# Patient Record
Sex: MALE | Race: WHITE | NOT HISPANIC OR LATINO | Employment: UNEMPLOYED | URBAN - METROPOLITAN AREA
[De-identification: names, ages, dates, MRNs, and addresses within clinical notes are randomized per-mention and may not be internally consistent; named-entity substitution may affect disease eponyms.]

---

## 2018-08-15 ENCOUNTER — HOSPITAL ENCOUNTER (EMERGENCY)
Facility: HOSPITAL | Age: 58
Discharge: HOME | End: 2018-08-15
Attending: EMERGENCY MEDICINE
Payer: MEDICAID

## 2018-08-15 VITALS
BODY MASS INDEX: 24.74 KG/M2 | RESPIRATION RATE: 20 BRPM | HEART RATE: 89 BPM | TEMPERATURE: 98 F | SYSTOLIC BLOOD PRESSURE: 159 MMHG | HEIGHT: 60 IN | OXYGEN SATURATION: 97 % | DIASTOLIC BLOOD PRESSURE: 92 MMHG | WEIGHT: 126 LBS

## 2018-08-15 DIAGNOSIS — T83.511A URINARY TRACT INFECTION ASSOCIATED WITH CATHETERIZATION OF URINARY TRACT, UNSPECIFIED INDWELLING URINARY CATHETER TYPE, INITIAL ENCOUNTER: ICD-10-CM

## 2018-08-15 DIAGNOSIS — N39.0 URINARY TRACT INFECTION ASSOCIATED WITH CATHETERIZATION OF URINARY TRACT, UNSPECIFIED INDWELLING URINARY CATHETER TYPE, INITIAL ENCOUNTER: ICD-10-CM

## 2018-08-15 DIAGNOSIS — R33.9 URINARY RETENTION: Primary | ICD-10-CM

## 2018-08-15 LAB
ALBUMIN SERPL BCP-MCNC: 3.8 G/DL
ALP SERPL-CCNC: 138 U/L
ALT SERPL W/O P-5'-P-CCNC: 12 U/L
ANION GAP SERPL CALC-SCNC: 9 MMOL/L
AST SERPL-CCNC: 17 U/L
BACTERIA #/AREA URNS HPF: ABNORMAL /HPF
BILIRUB SERPL-MCNC: 0.4 MG/DL
BILIRUB UR QL STRIP: NEGATIVE
BUN SERPL-MCNC: 15 MG/DL
CALCIUM SERPL-MCNC: 9.8 MG/DL
CHLORIDE SERPL-SCNC: 105 MMOL/L
CLARITY UR: ABNORMAL
CO2 SERPL-SCNC: 24 MMOL/L
COLOR UR: YELLOW
CREAT SERPL-MCNC: 0.9 MG/DL
ERYTHROCYTE [DISTWIDTH] IN BLOOD BY AUTOMATED COUNT: 13.3 %
EST. GFR  (AFRICAN AMERICAN): >60 ML/MIN/1.73 M^2
EST. GFR  (NON AFRICAN AMERICAN): >60 ML/MIN/1.73 M^2
GLUCOSE SERPL-MCNC: 98 MG/DL
GLUCOSE UR QL STRIP: NEGATIVE
HCT VFR BLD AUTO: 45.6 %
HGB BLD-MCNC: 15 G/DL
HGB UR QL STRIP: ABNORMAL
KETONES UR QL STRIP: NEGATIVE
LEUKOCYTE ESTERASE UR QL STRIP: ABNORMAL
MCH RBC QN AUTO: 29.8 PG
MCHC RBC AUTO-ENTMCNC: 32.9 G/DL
MCV RBC AUTO: 91 FL
MICROSCOPIC COMMENT: ABNORMAL
NITRITE UR QL STRIP: POSITIVE
PH UR STRIP: 8 [PH] (ref 5–8)
PLATELET # BLD AUTO: 269 K/UL
PMV BLD AUTO: 8.5 FL
POTASSIUM SERPL-SCNC: 5.1 MMOL/L
PROT SERPL-MCNC: 7.5 G/DL
PROT UR QL STRIP: NEGATIVE
RBC # BLD AUTO: 5.04 M/UL
RBC #/AREA URNS HPF: 10 /HPF (ref 0–4)
SODIUM SERPL-SCNC: 138 MMOL/L
SP GR UR STRIP: 1.01 (ref 1–1.03)
SQUAMOUS #/AREA URNS HPF: 0 /HPF
URN SPEC COLLECT METH UR: ABNORMAL
UROBILINOGEN UR STRIP-ACNC: NEGATIVE EU/DL
WBC # BLD AUTO: 11.5 K/UL
WBC #/AREA URNS HPF: 100 /HPF (ref 0–5)
WBC CLUMPS URNS QL MICRO: ABNORMAL

## 2018-08-15 PROCEDURE — 99284 EMERGENCY DEPT VISIT MOD MDM: CPT | Mod: 25

## 2018-08-15 PROCEDURE — 81000 URINALYSIS NONAUTO W/SCOPE: CPT

## 2018-08-15 PROCEDURE — 85027 COMPLETE CBC AUTOMATED: CPT

## 2018-08-15 PROCEDURE — 51702 INSERT TEMP BLADDER CATH: CPT

## 2018-08-15 PROCEDURE — 63600175 PHARM REV CODE 636 W HCPCS: Performed by: EMERGENCY MEDICINE

## 2018-08-15 PROCEDURE — 96365 THER/PROPH/DIAG IV INF INIT: CPT | Mod: 59

## 2018-08-15 PROCEDURE — 80053 COMPREHEN METABOLIC PANEL: CPT

## 2018-08-15 PROCEDURE — 25000003 PHARM REV CODE 250: Performed by: EMERGENCY MEDICINE

## 2018-08-15 PROCEDURE — 99285 EMERGENCY DEPT VISIT HI MDM: CPT | Mod: 25

## 2018-08-15 RX ORDER — OXYCODONE AND ACETAMINOPHEN 5; 325 MG/1; MG/1
1 TABLET ORAL
Status: COMPLETED | OUTPATIENT
Start: 2018-08-15 | End: 2018-08-15

## 2018-08-15 RX ORDER — CEPHALEXIN 500 MG/1
500 CAPSULE ORAL 4 TIMES DAILY
Qty: 20 CAPSULE | Refills: 0 | Status: SHIPPED | OUTPATIENT
Start: 2018-08-15 | End: 2018-08-20

## 2018-08-15 RX ADMIN — CEFTRIAXONE 1 G: 1 INJECTION, SOLUTION INTRAVENOUS at 05:08

## 2018-08-15 RX ADMIN — OXYCODONE HYDROCHLORIDE AND ACETAMINOPHEN 1 TABLET: 5; 325 TABLET ORAL at 05:08

## 2018-08-15 NOTE — ED NOTES
Pt presents to the ED w/ c/o of urinary retention. Pt reports that the last time he urinated was last night. Pt reports that he had penis pain from the pressure of the urine. Pt denies n/v or chest pain. Pt reports that the pain in his penis has eased up since the placement of the velazquez on arrival. Pt reports that he normally does self caths at home. Pt is from [facility].

## 2018-08-15 NOTE — ED NOTES
Urine noted in velazquez bag. Pt reports pressure has eased up at this time but still reports it is there.

## 2018-08-15 NOTE — ED PROVIDER NOTES
Encounter Date: 8/15/2018    SCRIBE #1 NOTE: I, Trey Eisebnerg, am scribing for, and in the presence of,  Dr. Lopez. I have scribed the entire note.       History     Chief Complaint   Patient presents with    Urinary Retention     last urinated fully yesterday- pt from [facility]- per EMS staff attempted to cath pt but unable to drain bladder. pt self caths     Shaw Heredia is a 58 y.o. male who  has no past medical history on file.    The patient presents to the ED due to urinary retention that began today. Patient reports he previously required indwelling catheter, but due to recurrent clogging of catheters, he has been self-catheterizing for the last 2 weeks. He reports today he was unable to empty his bladder with a cath per usual. He last fully urinated yesterday. His Urologist is Dr. Mendoza. Patient denies any dysuria, fever, abdominal pain, or systemic symptoms. He reports no hematuria, but he did notice some blood after a catheter was removed earlier today.        The history is provided by the patient.     Review of patient's allergies indicates:  No Known Allergies  No past medical history on file.  No past surgical history on file.  No family history on file.  Social History     Tobacco Use    Smoking status: Not on file   Substance Use Topics    Alcohol use: Not on file    Drug use: Not on file     Review of Systems   Constitutional: Negative for chills and fever.   HENT: Negative for congestion, ear pain, rhinorrhea and sore throat.    Respiratory: Negative for cough, shortness of breath and wheezing.    Cardiovascular: Negative for chest pain and palpitations.   Gastrointestinal: Negative for abdominal pain, diarrhea, nausea and vomiting.   Genitourinary: Negative for dysuria and hematuria.   Musculoskeletal: Negative for back pain, myalgias and neck pain.   Skin: Negative for rash.   Neurological: Negative for dizziness, weakness, light-headedness and headaches.   Psychiatric/Behavioral:  Negative for confusion.   All other systems reviewed and are negative.      Physical Exam     Initial Vitals [08/15/18 1408]   BP Pulse Resp Temp SpO2   138/76 105 18 99.5 °F (37.5 °C) 97 %      MAP       --         Physical Exam    Nursing note and vitals reviewed.  Constitutional: He appears well-developed and well-nourished. He is not diaphoretic. No distress.   HENT:   Head: Normocephalic and atraumatic.   Mouth/Throat: Oropharynx is clear and moist.   Eyes: Conjunctivae and EOM are normal. Pupils are equal, round, and reactive to light.   Neck: Normal range of motion. Neck supple.   Cardiovascular: Normal rate, regular rhythm, normal heart sounds and intact distal pulses.   Pulmonary/Chest: Breath sounds normal. No respiratory distress. He has no wheezes.   Abdominal: Soft. Bowel sounds are normal. He exhibits no distension and no mass. There is no tenderness.   Genitourinary:   Genitourinary Comments: Spliced inferior aspect of the penis and glans.  No active bleeding.   Musculoskeletal: Normal range of motion. He exhibits no edema or tenderness.   Neurological: He is alert and oriented to person, place, and time. He has normal strength. No cranial nerve deficit or sensory deficit.   Skin: Skin is warm and dry. No rash noted.   Psychiatric: Thought content normal.         ED Course   Procedures  Labs Reviewed   CBC WITHOUT DIFFERENTIAL - Abnormal; Notable for the following components:       Result Value    MPV 8.5 (*)     All other components within normal limits   COMPREHENSIVE METABOLIC PANEL - Abnormal; Notable for the following components:    Alkaline Phosphatase 138 (*)     All other components within normal limits   URINALYSIS - Abnormal; Notable for the following components:    Appearance, UA Cloudy (*)     Occult Blood UA 2+ (*)     Nitrite, UA Positive (*)     Leukocytes, UA 3+ (*)     All other components within normal limits   URINALYSIS MICROSCOPIC - Abnormal; Notable for the following components:     RBC, UA 10 (*)     WBC,  (*)     Bacteria, UA Many (*)     All other components within normal limits          Imaging Results    None          Medical Decision Making:   Initial Assessment:   59 y/o male history of chronic urinary retention requiring intermittent self cath presents with urinary retention and inability to pass a velazquez himself today. On arrival, vitals and exam unremarkable. Will obtain bladder scan, basic labs/UA, and attempt velazquez placement.  Differential Diagnosis:   Differential Diagnosis includes, but is not limited to:  STI, urethritis, testicular/appendix torsion, epididymitis, orchitis, UTI, kidney stone, urinary retention, appendicitis, prostatitis, testicular mass/neoplasm, incarcerated/strangulated hernia.    Clinical Tests:   Lab Tests: Ordered and Reviewed  ED Management:  Evlazquez placed in ED with successful drainage of urine.  UA revealed nitrite + UTI. RX Keflex.    Discussed patient with Dr. Mendoza, hospitals Urology, who agrees with plan for leaving indwelling velazquez in and following up in outpatient clinic.   Recommends flushing PRN to avoid sediment or obstruction.    Patient updated on findings and comfortable with plan at this time.  Patient given strict return precautions including worsening symptoms, worsening pain, inability to empty bladder, fever, or any other concerns.  Patient is stable for discharge.  Upon re-evaluation, the patient's status has imprved.  After complete ED evaluation, clinical impression is most consistent with urinary retention, UTI.  At this time, I feel there is no emergent condition requiring further evaluation or admission. I believe the patient is stable for discharge from the ED. The patient and any additional family present were updated with test results, overall clinical impression, and recommended further plan of care. All questions were answered. The patient expressed understanding and agreed with current plan for discharge with Urology  follow-up within 1 week. Strict return precautions were provided, including return/worsening of current symptoms or any other concerns.                               Clinical Impression:     1. Urinary retention    2. Urinary tract infection associated with catheterization of urinary tract, unspecified indwelling urinary catheter type, initial encounter                  I, Dr. Robert Lopez, personally performed the services described in this documentation. All medical record entries made by the scribe were at my direction and in my presence.  I have reviewed the chart and agree that the record reflects my personal performance and is accurate and complete.     Robert Lopez MD.                 Robert Lopez MD  09/13/18 0502

## 2020-05-27 DIAGNOSIS — Z20.822 SUSPECTED COVID-19 VIRUS INFECTION: Primary | ICD-10-CM

## 2020-05-29 ENCOUNTER — LAB VISIT (OUTPATIENT)
Dept: LAB | Facility: HOSPITAL | Age: 60
End: 2020-05-29
Payer: MEDICAID

## 2020-05-29 DIAGNOSIS — Z20.822 SUSPECTED COVID-19 VIRUS INFECTION: ICD-10-CM

## 2020-05-29 LAB — SARS-COV-2 IGG SERPLBLD QL IA.RAPID: NEGATIVE

## 2020-05-29 PROCEDURE — 86769 SARS-COV-2 COVID-19 ANTIBODY: CPT

## 2020-05-29 PROCEDURE — 36415 COLL VENOUS BLD VENIPUNCTURE: CPT

## 2020-06-08 ENCOUNTER — LAB VISIT (OUTPATIENT)
Dept: LAB | Facility: HOSPITAL | Age: 60
End: 2020-06-08
Payer: MEDICAID

## 2020-06-08 DIAGNOSIS — Z20.822 SUSPECTED COVID-19 VIRUS INFECTION: ICD-10-CM

## 2020-06-08 PROCEDURE — U0003 INFECTIOUS AGENT DETECTION BY NUCLEIC ACID (DNA OR RNA); SEVERE ACUTE RESPIRATORY SYNDROME CORONAVIRUS 2 (SARS-COV-2) (CORONAVIRUS DISEASE [COVID-19]), AMPLIFIED PROBE TECHNIQUE, MAKING USE OF HIGH THROUGHPUT TECHNOLOGIES AS DESCRIBED BY CMS-2020-01-R: HCPCS

## 2020-06-10 LAB — SARS-COV-2 RNA RESP QL NAA+PROBE: NOT DETECTED

## 2020-06-15 ENCOUNTER — LAB VISIT (OUTPATIENT)
Dept: LAB | Facility: OTHER | Age: 60
End: 2020-06-15
Attending: INTERNAL MEDICINE
Payer: MEDICAID

## 2020-06-15 DIAGNOSIS — Z20.822 SUSPECTED COVID-19 VIRUS INFECTION: ICD-10-CM

## 2020-06-15 PROCEDURE — U0003 INFECTIOUS AGENT DETECTION BY NUCLEIC ACID (DNA OR RNA); SEVERE ACUTE RESPIRATORY SYNDROME CORONAVIRUS 2 (SARS-COV-2) (CORONAVIRUS DISEASE [COVID-19]), AMPLIFIED PROBE TECHNIQUE, MAKING USE OF HIGH THROUGHPUT TECHNOLOGIES AS DESCRIBED BY CMS-2020-01-R: HCPCS

## 2020-06-17 LAB — SARS-COV-2 RNA RESP QL NAA+PROBE: NOT DETECTED

## 2020-06-22 ENCOUNTER — LAB VISIT (OUTPATIENT)
Dept: LAB | Facility: OTHER | Age: 60
End: 2020-06-22
Payer: MEDICAID

## 2020-06-22 DIAGNOSIS — Z20.822 SUSPECTED COVID-19 VIRUS INFECTION: ICD-10-CM

## 2020-06-22 PROCEDURE — U0003 INFECTIOUS AGENT DETECTION BY NUCLEIC ACID (DNA OR RNA); SEVERE ACUTE RESPIRATORY SYNDROME CORONAVIRUS 2 (SARS-COV-2) (CORONAVIRUS DISEASE [COVID-19]), AMPLIFIED PROBE TECHNIQUE, MAKING USE OF HIGH THROUGHPUT TECHNOLOGIES AS DESCRIBED BY CMS-2020-01-R: HCPCS

## 2020-06-25 LAB — SARS-COV-2 RNA RESP QL NAA+PROBE: NOT DETECTED

## 2020-06-29 ENCOUNTER — LAB VISIT (OUTPATIENT)
Dept: LAB | Facility: OTHER | Age: 60
End: 2020-06-29
Payer: MEDICAID

## 2020-06-29 DIAGNOSIS — Z20.822 SUSPECTED COVID-19 VIRUS INFECTION: ICD-10-CM

## 2020-06-29 PROCEDURE — U0003 INFECTIOUS AGENT DETECTION BY NUCLEIC ACID (DNA OR RNA); SEVERE ACUTE RESPIRATORY SYNDROME CORONAVIRUS 2 (SARS-COV-2) (CORONAVIRUS DISEASE [COVID-19]), AMPLIFIED PROBE TECHNIQUE, MAKING USE OF HIGH THROUGHPUT TECHNOLOGIES AS DESCRIBED BY CMS-2020-01-R: HCPCS

## 2020-07-02 LAB — SARS-COV-2 RNA RESP QL NAA+PROBE: NEGATIVE

## 2020-07-30 ENCOUNTER — LAB VISIT (OUTPATIENT)
Dept: LAB | Facility: OTHER | Age: 60
End: 2020-07-30
Payer: MEDICAID

## 2020-07-30 DIAGNOSIS — Z03.818 ENCOUNTER FOR OBSERVATION FOR SUSPECTED EXPOSURE TO OTHER BIOLOGICAL AGENTS RULED OUT: ICD-10-CM

## 2020-07-30 DIAGNOSIS — Z20.822 SUSPECTED COVID-19 VIRUS INFECTION: ICD-10-CM

## 2020-07-30 PROCEDURE — U0003 INFECTIOUS AGENT DETECTION BY NUCLEIC ACID (DNA OR RNA); SEVERE ACUTE RESPIRATORY SYNDROME CORONAVIRUS 2 (SARS-COV-2) (CORONAVIRUS DISEASE [COVID-19]), AMPLIFIED PROBE TECHNIQUE, MAKING USE OF HIGH THROUGHPUT TECHNOLOGIES AS DESCRIBED BY CMS-2020-01-R: HCPCS

## 2020-08-03 LAB — SARS-COV-2 RNA RESP QL NAA+PROBE: NORMAL

## 2020-08-06 ENCOUNTER — LAB VISIT (OUTPATIENT)
Dept: LAB | Facility: OTHER | Age: 60
End: 2020-08-06
Payer: MEDICAID

## 2020-08-06 DIAGNOSIS — Z03.818 ENCOUNTER FOR OBSERVATION FOR SUSPECTED EXPOSURE TO OTHER BIOLOGICAL AGENTS RULED OUT: ICD-10-CM

## 2020-08-06 PROCEDURE — U0003 INFECTIOUS AGENT DETECTION BY NUCLEIC ACID (DNA OR RNA); SEVERE ACUTE RESPIRATORY SYNDROME CORONAVIRUS 2 (SARS-COV-2) (CORONAVIRUS DISEASE [COVID-19]), AMPLIFIED PROBE TECHNIQUE, MAKING USE OF HIGH THROUGHPUT TECHNOLOGIES AS DESCRIBED BY CMS-2020-01-R: HCPCS

## 2020-08-07 LAB — SARS-COV-2 RNA RESP QL NAA+PROBE: NOT DETECTED

## 2020-08-13 ENCOUNTER — LAB VISIT (OUTPATIENT)
Dept: LAB | Facility: OTHER | Age: 60
End: 2020-08-13
Payer: MEDICAID

## 2020-08-13 DIAGNOSIS — Z03.818 ENCOUNTER FOR OBSERVATION FOR SUSPECTED EXPOSURE TO OTHER BIOLOGICAL AGENTS RULED OUT: ICD-10-CM

## 2020-08-13 PROCEDURE — U0003 INFECTIOUS AGENT DETECTION BY NUCLEIC ACID (DNA OR RNA); SEVERE ACUTE RESPIRATORY SYNDROME CORONAVIRUS 2 (SARS-COV-2) (CORONAVIRUS DISEASE [COVID-19]), AMPLIFIED PROBE TECHNIQUE, MAKING USE OF HIGH THROUGHPUT TECHNOLOGIES AS DESCRIBED BY CMS-2020-01-R: HCPCS

## 2020-08-16 LAB — SARS-COV-2 RNA RESP QL NAA+PROBE: NOT DETECTED

## 2020-08-20 ENCOUNTER — LAB VISIT (OUTPATIENT)
Dept: LAB | Facility: OTHER | Age: 60
End: 2020-08-20
Payer: MEDICAID

## 2020-08-20 DIAGNOSIS — Z03.818 ENCOUNTER FOR OBSERVATION FOR SUSPECTED EXPOSURE TO OTHER BIOLOGICAL AGENTS RULED OUT: ICD-10-CM

## 2020-08-20 PROCEDURE — U0003 INFECTIOUS AGENT DETECTION BY NUCLEIC ACID (DNA OR RNA); SEVERE ACUTE RESPIRATORY SYNDROME CORONAVIRUS 2 (SARS-COV-2) (CORONAVIRUS DISEASE [COVID-19]), AMPLIFIED PROBE TECHNIQUE, MAKING USE OF HIGH THROUGHPUT TECHNOLOGIES AS DESCRIBED BY CMS-2020-01-R: HCPCS

## 2020-08-21 LAB — SARS-COV-2 RNA RESP QL NAA+PROBE: NOT DETECTED

## 2020-08-27 ENCOUNTER — LAB VISIT (OUTPATIENT)
Dept: LAB | Facility: OTHER | Age: 60
End: 2020-08-27
Payer: MEDICAID

## 2020-08-27 DIAGNOSIS — Z03.818 ENCOUNTER FOR OBSERVATION FOR SUSPECTED EXPOSURE TO OTHER BIOLOGICAL AGENTS RULED OUT: ICD-10-CM

## 2020-08-27 PROCEDURE — U0003 INFECTIOUS AGENT DETECTION BY NUCLEIC ACID (DNA OR RNA); SEVERE ACUTE RESPIRATORY SYNDROME CORONAVIRUS 2 (SARS-COV-2) (CORONAVIRUS DISEASE [COVID-19]), AMPLIFIED PROBE TECHNIQUE, MAKING USE OF HIGH THROUGHPUT TECHNOLOGIES AS DESCRIBED BY CMS-2020-01-R: HCPCS

## 2020-08-28 LAB — SARS-COV-2 RNA RESP QL NAA+PROBE: NOT DETECTED

## 2020-09-03 ENCOUNTER — LAB VISIT (OUTPATIENT)
Dept: LAB | Facility: OTHER | Age: 60
End: 2020-09-03
Payer: MEDICAID

## 2020-09-03 DIAGNOSIS — Z03.818 ENCOUNTER FOR OBSERVATION FOR SUSPECTED EXPOSURE TO OTHER BIOLOGICAL AGENTS RULED OUT: ICD-10-CM

## 2020-09-03 PROCEDURE — U0003 INFECTIOUS AGENT DETECTION BY NUCLEIC ACID (DNA OR RNA); SEVERE ACUTE RESPIRATORY SYNDROME CORONAVIRUS 2 (SARS-COV-2) (CORONAVIRUS DISEASE [COVID-19]), AMPLIFIED PROBE TECHNIQUE, MAKING USE OF HIGH THROUGHPUT TECHNOLOGIES AS DESCRIBED BY CMS-2020-01-R: HCPCS

## 2020-09-05 LAB — SARS-COV-2 RNA RESP QL NAA+PROBE: NOT DETECTED

## 2020-10-01 ENCOUNTER — LAB VISIT (OUTPATIENT)
Dept: LAB | Facility: OTHER | Age: 60
End: 2020-10-01
Payer: MEDICAID

## 2020-10-01 DIAGNOSIS — Z03.818 ENCOUNTER FOR OBSERVATION FOR SUSPECTED EXPOSURE TO OTHER BIOLOGICAL AGENTS RULED OUT: ICD-10-CM

## 2020-10-01 PROCEDURE — U0003 INFECTIOUS AGENT DETECTION BY NUCLEIC ACID (DNA OR RNA); SEVERE ACUTE RESPIRATORY SYNDROME CORONAVIRUS 2 (SARS-COV-2) (CORONAVIRUS DISEASE [COVID-19]), AMPLIFIED PROBE TECHNIQUE, MAKING USE OF HIGH THROUGHPUT TECHNOLOGIES AS DESCRIBED BY CMS-2020-01-R: HCPCS

## 2020-10-03 LAB — SARS-COV-2 RNA RESP QL NAA+PROBE: NOT DETECTED

## 2020-10-08 ENCOUNTER — LAB VISIT (OUTPATIENT)
Dept: LAB | Facility: OTHER | Age: 60
End: 2020-10-08
Payer: MEDICAID

## 2020-10-08 DIAGNOSIS — Z03.818 ENCOUNTER FOR OBSERVATION FOR SUSPECTED EXPOSURE TO OTHER BIOLOGICAL AGENTS RULED OUT: ICD-10-CM

## 2020-10-08 PROCEDURE — U0003 INFECTIOUS AGENT DETECTION BY NUCLEIC ACID (DNA OR RNA); SEVERE ACUTE RESPIRATORY SYNDROME CORONAVIRUS 2 (SARS-COV-2) (CORONAVIRUS DISEASE [COVID-19]), AMPLIFIED PROBE TECHNIQUE, MAKING USE OF HIGH THROUGHPUT TECHNOLOGIES AS DESCRIBED BY CMS-2020-01-R: HCPCS

## 2020-10-09 LAB — SARS-COV-2 RNA RESP QL NAA+PROBE: NOT DETECTED

## 2020-10-15 ENCOUNTER — LAB VISIT (OUTPATIENT)
Dept: LAB | Facility: OTHER | Age: 60
End: 2020-10-15
Payer: MEDICAID

## 2020-10-15 DIAGNOSIS — Z03.818 ENCOUNTER FOR OBSERVATION FOR SUSPECTED EXPOSURE TO OTHER BIOLOGICAL AGENTS RULED OUT: ICD-10-CM

## 2020-10-15 PROCEDURE — U0003 INFECTIOUS AGENT DETECTION BY NUCLEIC ACID (DNA OR RNA); SEVERE ACUTE RESPIRATORY SYNDROME CORONAVIRUS 2 (SARS-COV-2) (CORONAVIRUS DISEASE [COVID-19]), AMPLIFIED PROBE TECHNIQUE, MAKING USE OF HIGH THROUGHPUT TECHNOLOGIES AS DESCRIBED BY CMS-2020-01-R: HCPCS

## 2020-10-16 LAB — SARS-COV-2 RNA RESP QL NAA+PROBE: NOT DETECTED

## 2020-10-22 ENCOUNTER — LAB VISIT (OUTPATIENT)
Dept: LAB | Facility: OTHER | Age: 60
End: 2020-10-22
Payer: MEDICAID

## 2020-10-22 DIAGNOSIS — Z03.818 ENCOUNTER FOR OBSERVATION FOR SUSPECTED EXPOSURE TO OTHER BIOLOGICAL AGENTS RULED OUT: ICD-10-CM

## 2020-10-22 PROCEDURE — U0003 INFECTIOUS AGENT DETECTION BY NUCLEIC ACID (DNA OR RNA); SEVERE ACUTE RESPIRATORY SYNDROME CORONAVIRUS 2 (SARS-COV-2) (CORONAVIRUS DISEASE [COVID-19]), AMPLIFIED PROBE TECHNIQUE, MAKING USE OF HIGH THROUGHPUT TECHNOLOGIES AS DESCRIBED BY CMS-2020-01-R: HCPCS

## 2020-10-23 LAB — SARS-COV-2 RNA RESP QL NAA+PROBE: NOT DETECTED

## 2020-10-29 ENCOUNTER — LAB VISIT (OUTPATIENT)
Dept: LAB | Facility: OTHER | Age: 60
End: 2020-10-29
Payer: MEDICAID

## 2020-10-29 DIAGNOSIS — Z03.818 ENCOUNTER FOR OBSERVATION FOR SUSPECTED EXPOSURE TO OTHER BIOLOGICAL AGENTS RULED OUT: ICD-10-CM

## 2020-10-29 PROCEDURE — U0003 INFECTIOUS AGENT DETECTION BY NUCLEIC ACID (DNA OR RNA); SEVERE ACUTE RESPIRATORY SYNDROME CORONAVIRUS 2 (SARS-COV-2) (CORONAVIRUS DISEASE [COVID-19]), AMPLIFIED PROBE TECHNIQUE, MAKING USE OF HIGH THROUGHPUT TECHNOLOGIES AS DESCRIBED BY CMS-2020-01-R: HCPCS

## 2020-10-30 LAB — SARS-COV-2 RNA RESP QL NAA+PROBE: NOT DETECTED

## 2020-11-05 ENCOUNTER — LAB VISIT (OUTPATIENT)
Dept: LAB | Facility: OTHER | Age: 60
End: 2020-11-05
Payer: MEDICAID

## 2020-11-05 DIAGNOSIS — Z03.818 ENCOUNTER FOR OBSERVATION FOR SUSPECTED EXPOSURE TO OTHER BIOLOGICAL AGENTS RULED OUT: ICD-10-CM

## 2020-11-05 PROCEDURE — U0003 INFECTIOUS AGENT DETECTION BY NUCLEIC ACID (DNA OR RNA); SEVERE ACUTE RESPIRATORY SYNDROME CORONAVIRUS 2 (SARS-COV-2) (CORONAVIRUS DISEASE [COVID-19]), AMPLIFIED PROBE TECHNIQUE, MAKING USE OF HIGH THROUGHPUT TECHNOLOGIES AS DESCRIBED BY CMS-2020-01-R: HCPCS

## 2020-11-06 LAB — SARS-COV-2 RNA RESP QL NAA+PROBE: NOT DETECTED

## 2020-11-12 ENCOUNTER — LAB VISIT (OUTPATIENT)
Dept: LAB | Facility: OTHER | Age: 60
End: 2020-11-12
Payer: MEDICAID

## 2020-11-12 DIAGNOSIS — Z03.818 ENCOUNTER FOR OBSERVATION FOR SUSPECTED EXPOSURE TO OTHER BIOLOGICAL AGENTS RULED OUT: ICD-10-CM

## 2020-11-12 PROCEDURE — U0003 INFECTIOUS AGENT DETECTION BY NUCLEIC ACID (DNA OR RNA); SEVERE ACUTE RESPIRATORY SYNDROME CORONAVIRUS 2 (SARS-COV-2) (CORONAVIRUS DISEASE [COVID-19]), AMPLIFIED PROBE TECHNIQUE, MAKING USE OF HIGH THROUGHPUT TECHNOLOGIES AS DESCRIBED BY CMS-2020-01-R: HCPCS

## 2020-11-13 LAB — SARS-COV-2 RNA RESP QL NAA+PROBE: NOT DETECTED

## 2020-12-03 ENCOUNTER — LAB VISIT (OUTPATIENT)
Dept: LAB | Facility: OTHER | Age: 60
End: 2020-12-03
Payer: MEDICAID

## 2020-12-03 DIAGNOSIS — Z03.818 ENCOUNTER FOR OBSERVATION FOR SUSPECTED EXPOSURE TO OTHER BIOLOGICAL AGENTS RULED OUT: ICD-10-CM

## 2020-12-03 PROCEDURE — U0003 INFECTIOUS AGENT DETECTION BY NUCLEIC ACID (DNA OR RNA); SEVERE ACUTE RESPIRATORY SYNDROME CORONAVIRUS 2 (SARS-COV-2) (CORONAVIRUS DISEASE [COVID-19]), AMPLIFIED PROBE TECHNIQUE, MAKING USE OF HIGH THROUGHPUT TECHNOLOGIES AS DESCRIBED BY CMS-2020-01-R: HCPCS

## 2020-12-05 LAB — SARS-COV-2 RNA RESP QL NAA+PROBE: NOT DETECTED

## 2020-12-10 ENCOUNTER — LAB VISIT (OUTPATIENT)
Dept: LAB | Facility: OTHER | Age: 60
End: 2020-12-10
Payer: MEDICAID

## 2020-12-10 DIAGNOSIS — Z03.818 ENCOUNTER FOR OBSERVATION FOR SUSPECTED EXPOSURE TO OTHER BIOLOGICAL AGENTS RULED OUT: ICD-10-CM

## 2020-12-10 PROCEDURE — U0003 INFECTIOUS AGENT DETECTION BY NUCLEIC ACID (DNA OR RNA); SEVERE ACUTE RESPIRATORY SYNDROME CORONAVIRUS 2 (SARS-COV-2) (CORONAVIRUS DISEASE [COVID-19]), AMPLIFIED PROBE TECHNIQUE, MAKING USE OF HIGH THROUGHPUT TECHNOLOGIES AS DESCRIBED BY CMS-2020-01-R: HCPCS

## 2020-12-11 LAB — SARS-COV-2 RNA RESP QL NAA+PROBE: NOT DETECTED

## 2020-12-17 ENCOUNTER — LAB VISIT (OUTPATIENT)
Dept: LAB | Facility: OTHER | Age: 60
End: 2020-12-17
Payer: MEDICAID

## 2020-12-17 DIAGNOSIS — Z03.818 ENCOUNTER FOR OBSERVATION FOR SUSPECTED EXPOSURE TO OTHER BIOLOGICAL AGENTS RULED OUT: ICD-10-CM

## 2020-12-17 PROCEDURE — U0003 INFECTIOUS AGENT DETECTION BY NUCLEIC ACID (DNA OR RNA); SEVERE ACUTE RESPIRATORY SYNDROME CORONAVIRUS 2 (SARS-COV-2) (CORONAVIRUS DISEASE [COVID-19]), AMPLIFIED PROBE TECHNIQUE, MAKING USE OF HIGH THROUGHPUT TECHNOLOGIES AS DESCRIBED BY CMS-2020-01-R: HCPCS

## 2020-12-20 LAB — SARS-COV-2 RNA RESP QL NAA+PROBE: NOT DETECTED

## 2020-12-24 ENCOUNTER — LAB VISIT (OUTPATIENT)
Dept: LAB | Facility: OTHER | Age: 60
End: 2020-12-24
Payer: MEDICAID

## 2020-12-24 DIAGNOSIS — Z03.818 ENCOUNTER FOR OBSERVATION FOR SUSPECTED EXPOSURE TO OTHER BIOLOGICAL AGENTS RULED OUT: ICD-10-CM

## 2020-12-24 PROCEDURE — U0003 INFECTIOUS AGENT DETECTION BY NUCLEIC ACID (DNA OR RNA); SEVERE ACUTE RESPIRATORY SYNDROME CORONAVIRUS 2 (SARS-COV-2) (CORONAVIRUS DISEASE [COVID-19]), AMPLIFIED PROBE TECHNIQUE, MAKING USE OF HIGH THROUGHPUT TECHNOLOGIES AS DESCRIBED BY CMS-2020-01-R: HCPCS

## 2020-12-25 LAB — SARS-COV-2 RNA RESP QL NAA+PROBE: NOT DETECTED

## 2020-12-29 ENCOUNTER — LAB VISIT (OUTPATIENT)
Dept: LAB | Facility: OTHER | Age: 60
End: 2020-12-29
Payer: MEDICAID

## 2020-12-29 DIAGNOSIS — Z03.818 ENCOUNTER FOR OBSERVATION FOR SUSPECTED EXPOSURE TO OTHER BIOLOGICAL AGENTS RULED OUT: ICD-10-CM

## 2020-12-29 PROCEDURE — U0003 INFECTIOUS AGENT DETECTION BY NUCLEIC ACID (DNA OR RNA); SEVERE ACUTE RESPIRATORY SYNDROME CORONAVIRUS 2 (SARS-COV-2) (CORONAVIRUS DISEASE [COVID-19]), AMPLIFIED PROBE TECHNIQUE, MAKING USE OF HIGH THROUGHPUT TECHNOLOGIES AS DESCRIBED BY CMS-2020-01-R: HCPCS

## 2020-12-30 LAB — SARS-COV-2 RNA RESP QL NAA+PROBE: NOT DETECTED

## 2021-01-04 ENCOUNTER — LAB VISIT (OUTPATIENT)
Dept: LAB | Facility: OTHER | Age: 61
End: 2021-01-04
Payer: MEDICAID

## 2021-01-04 DIAGNOSIS — Z03.818 ENCOUNTER FOR OBSERVATION FOR SUSPECTED EXPOSURE TO OTHER BIOLOGICAL AGENTS RULED OUT: ICD-10-CM

## 2021-01-04 PROCEDURE — U0003 INFECTIOUS AGENT DETECTION BY NUCLEIC ACID (DNA OR RNA); SEVERE ACUTE RESPIRATORY SYNDROME CORONAVIRUS 2 (SARS-COV-2) (CORONAVIRUS DISEASE [COVID-19]), AMPLIFIED PROBE TECHNIQUE, MAKING USE OF HIGH THROUGHPUT TECHNOLOGIES AS DESCRIBED BY CMS-2020-01-R: HCPCS

## 2021-01-05 LAB — SARS-COV-2 RNA RESP QL NAA+PROBE: NOT DETECTED

## 2021-01-11 ENCOUNTER — LAB VISIT (OUTPATIENT)
Dept: LAB | Facility: OTHER | Age: 61
End: 2021-01-11
Payer: MEDICAID

## 2021-01-11 DIAGNOSIS — Z03.818 ENCOUNTER FOR OBSERVATION FOR SUSPECTED EXPOSURE TO OTHER BIOLOGICAL AGENTS RULED OUT: ICD-10-CM

## 2021-01-11 PROCEDURE — U0003 INFECTIOUS AGENT DETECTION BY NUCLEIC ACID (DNA OR RNA); SEVERE ACUTE RESPIRATORY SYNDROME CORONAVIRUS 2 (SARS-COV-2) (CORONAVIRUS DISEASE [COVID-19]), AMPLIFIED PROBE TECHNIQUE, MAKING USE OF HIGH THROUGHPUT TECHNOLOGIES AS DESCRIBED BY CMS-2020-01-R: HCPCS

## 2021-01-13 LAB — SARS-COV-2 RNA RESP QL NAA+PROBE: NOT DETECTED

## 2021-01-18 ENCOUNTER — LAB VISIT (OUTPATIENT)
Dept: LAB | Facility: OTHER | Age: 61
End: 2021-01-18
Payer: MEDICAID

## 2021-01-18 DIAGNOSIS — Z20.822 ENCOUNTER FOR LABORATORY TESTING FOR COVID-19 VIRUS: ICD-10-CM

## 2021-01-18 PROCEDURE — U0003 INFECTIOUS AGENT DETECTION BY NUCLEIC ACID (DNA OR RNA); SEVERE ACUTE RESPIRATORY SYNDROME CORONAVIRUS 2 (SARS-COV-2) (CORONAVIRUS DISEASE [COVID-19]), AMPLIFIED PROBE TECHNIQUE, MAKING USE OF HIGH THROUGHPUT TECHNOLOGIES AS DESCRIBED BY CMS-2020-01-R: HCPCS

## 2021-01-19 LAB — SARS-COV-2 RNA RESP QL NAA+PROBE: NOT DETECTED

## 2021-01-25 ENCOUNTER — LAB VISIT (OUTPATIENT)
Dept: LAB | Facility: OTHER | Age: 61
End: 2021-01-25
Payer: MEDICAID

## 2021-01-25 DIAGNOSIS — Z20.822 ENCOUNTER FOR LABORATORY TESTING FOR COVID-19 VIRUS: ICD-10-CM

## 2021-01-25 PROCEDURE — U0003 INFECTIOUS AGENT DETECTION BY NUCLEIC ACID (DNA OR RNA); SEVERE ACUTE RESPIRATORY SYNDROME CORONAVIRUS 2 (SARS-COV-2) (CORONAVIRUS DISEASE [COVID-19]), AMPLIFIED PROBE TECHNIQUE, MAKING USE OF HIGH THROUGHPUT TECHNOLOGIES AS DESCRIBED BY CMS-2020-01-R: HCPCS

## 2021-01-26 LAB — SARS-COV-2 RNA RESP QL NAA+PROBE: NOT DETECTED

## 2021-02-01 ENCOUNTER — LAB VISIT (OUTPATIENT)
Dept: LAB | Facility: OTHER | Age: 61
End: 2021-02-01
Payer: MEDICAID

## 2021-02-01 DIAGNOSIS — Z20.822 ENCOUNTER FOR LABORATORY TESTING FOR COVID-19 VIRUS: ICD-10-CM

## 2021-02-01 PROCEDURE — U0003 INFECTIOUS AGENT DETECTION BY NUCLEIC ACID (DNA OR RNA); SEVERE ACUTE RESPIRATORY SYNDROME CORONAVIRUS 2 (SARS-COV-2) (CORONAVIRUS DISEASE [COVID-19]), AMPLIFIED PROBE TECHNIQUE, MAKING USE OF HIGH THROUGHPUT TECHNOLOGIES AS DESCRIBED BY CMS-2020-01-R: HCPCS

## 2021-02-02 LAB — SARS-COV-2 RNA RESP QL NAA+PROBE: NOT DETECTED

## 2021-02-08 ENCOUNTER — LAB VISIT (OUTPATIENT)
Dept: LAB | Facility: OTHER | Age: 61
End: 2021-02-08
Payer: MEDICAID

## 2021-02-08 DIAGNOSIS — Z20.822 ENCOUNTER FOR LABORATORY TESTING FOR COVID-19 VIRUS: ICD-10-CM

## 2021-02-08 PROCEDURE — U0003 INFECTIOUS AGENT DETECTION BY NUCLEIC ACID (DNA OR RNA); SEVERE ACUTE RESPIRATORY SYNDROME CORONAVIRUS 2 (SARS-COV-2) (CORONAVIRUS DISEASE [COVID-19]), AMPLIFIED PROBE TECHNIQUE, MAKING USE OF HIGH THROUGHPUT TECHNOLOGIES AS DESCRIBED BY CMS-2020-01-R: HCPCS

## 2021-02-09 LAB — SARS-COV-2 RNA RESP QL NAA+PROBE: NOT DETECTED

## 2021-02-18 ENCOUNTER — LAB VISIT (OUTPATIENT)
Dept: LAB | Facility: OTHER | Age: 61
End: 2021-02-18
Payer: MEDICAID

## 2021-02-18 DIAGNOSIS — Z20.822 ENCOUNTER FOR LABORATORY TESTING FOR COVID-19 VIRUS: ICD-10-CM

## 2021-02-18 PROCEDURE — U0003 INFECTIOUS AGENT DETECTION BY NUCLEIC ACID (DNA OR RNA); SEVERE ACUTE RESPIRATORY SYNDROME CORONAVIRUS 2 (SARS-COV-2) (CORONAVIRUS DISEASE [COVID-19]), AMPLIFIED PROBE TECHNIQUE, MAKING USE OF HIGH THROUGHPUT TECHNOLOGIES AS DESCRIBED BY CMS-2020-01-R: HCPCS

## 2021-02-19 LAB — SARS-COV-2 RNA RESP QL NAA+PROBE: NOT DETECTED

## 2021-02-22 ENCOUNTER — LAB VISIT (OUTPATIENT)
Dept: LAB | Facility: OTHER | Age: 61
End: 2021-02-22
Payer: MEDICAID

## 2021-02-22 DIAGNOSIS — Z20.822 ENCOUNTER FOR LABORATORY TESTING FOR COVID-19 VIRUS: ICD-10-CM

## 2021-02-22 PROCEDURE — U0003 INFECTIOUS AGENT DETECTION BY NUCLEIC ACID (DNA OR RNA); SEVERE ACUTE RESPIRATORY SYNDROME CORONAVIRUS 2 (SARS-COV-2) (CORONAVIRUS DISEASE [COVID-19]), AMPLIFIED PROBE TECHNIQUE, MAKING USE OF HIGH THROUGHPUT TECHNOLOGIES AS DESCRIBED BY CMS-2020-01-R: HCPCS

## 2021-02-23 LAB — SARS-COV-2 RNA RESP QL NAA+PROBE: NOT DETECTED

## 2021-03-01 ENCOUNTER — LAB VISIT (OUTPATIENT)
Dept: LAB | Facility: OTHER | Age: 61
End: 2021-03-01
Payer: MEDICAID

## 2021-03-01 DIAGNOSIS — Z20.822 ENCOUNTER FOR LABORATORY TESTING FOR COVID-19 VIRUS: ICD-10-CM

## 2021-03-01 PROCEDURE — U0003 INFECTIOUS AGENT DETECTION BY NUCLEIC ACID (DNA OR RNA); SEVERE ACUTE RESPIRATORY SYNDROME CORONAVIRUS 2 (SARS-COV-2) (CORONAVIRUS DISEASE [COVID-19]), AMPLIFIED PROBE TECHNIQUE, MAKING USE OF HIGH THROUGHPUT TECHNOLOGIES AS DESCRIBED BY CMS-2020-01-R: HCPCS

## 2021-03-02 LAB — SARS-COV-2 RNA RESP QL NAA+PROBE: NOT DETECTED

## 2021-03-08 ENCOUNTER — LAB VISIT (OUTPATIENT)
Dept: LAB | Facility: OTHER | Age: 61
End: 2021-03-08
Payer: MEDICAID

## 2021-03-08 DIAGNOSIS — Z20.822 ENCOUNTER FOR LABORATORY TESTING FOR COVID-19 VIRUS: ICD-10-CM

## 2021-03-08 PROCEDURE — U0003 INFECTIOUS AGENT DETECTION BY NUCLEIC ACID (DNA OR RNA); SEVERE ACUTE RESPIRATORY SYNDROME CORONAVIRUS 2 (SARS-COV-2) (CORONAVIRUS DISEASE [COVID-19]), AMPLIFIED PROBE TECHNIQUE, MAKING USE OF HIGH THROUGHPUT TECHNOLOGIES AS DESCRIBED BY CMS-2020-01-R: HCPCS | Performed by: NURSE PRACTITIONER

## 2021-03-09 LAB — SARS-COV-2 RNA RESP QL NAA+PROBE: NOT DETECTED

## 2021-03-15 ENCOUNTER — LAB VISIT (OUTPATIENT)
Dept: LAB | Facility: OTHER | Age: 61
End: 2021-03-15
Payer: MEDICAID

## 2021-03-15 DIAGNOSIS — Z20.822 ENCOUNTER FOR LABORATORY TESTING FOR COVID-19 VIRUS: ICD-10-CM

## 2021-03-15 PROCEDURE — U0003 INFECTIOUS AGENT DETECTION BY NUCLEIC ACID (DNA OR RNA); SEVERE ACUTE RESPIRATORY SYNDROME CORONAVIRUS 2 (SARS-COV-2) (CORONAVIRUS DISEASE [COVID-19]), AMPLIFIED PROBE TECHNIQUE, MAKING USE OF HIGH THROUGHPUT TECHNOLOGIES AS DESCRIBED BY CMS-2020-01-R: HCPCS | Performed by: NURSE PRACTITIONER

## 2021-03-16 LAB — SARS-COV-2 RNA RESP QL NAA+PROBE: NOT DETECTED

## 2021-03-22 ENCOUNTER — LAB VISIT (OUTPATIENT)
Dept: LAB | Facility: OTHER | Age: 61
End: 2021-03-22
Payer: MEDICAID

## 2021-03-22 DIAGNOSIS — Z20.822 ENCOUNTER FOR LABORATORY TESTING FOR COVID-19 VIRUS: ICD-10-CM

## 2021-03-22 PROCEDURE — U0003 INFECTIOUS AGENT DETECTION BY NUCLEIC ACID (DNA OR RNA); SEVERE ACUTE RESPIRATORY SYNDROME CORONAVIRUS 2 (SARS-COV-2) (CORONAVIRUS DISEASE [COVID-19]), AMPLIFIED PROBE TECHNIQUE, MAKING USE OF HIGH THROUGHPUT TECHNOLOGIES AS DESCRIBED BY CMS-2020-01-R: HCPCS | Performed by: NURSE PRACTITIONER

## 2021-03-23 LAB — SARS-COV-2 RNA RESP QL NAA+PROBE: NOT DETECTED

## 2021-04-05 ENCOUNTER — LAB VISIT (OUTPATIENT)
Dept: LAB | Facility: OTHER | Age: 61
End: 2021-04-05
Payer: MEDICAID

## 2021-04-05 DIAGNOSIS — Z20.822 ENCOUNTER FOR LABORATORY TESTING FOR COVID-19 VIRUS: ICD-10-CM

## 2021-04-05 PROCEDURE — U0003 INFECTIOUS AGENT DETECTION BY NUCLEIC ACID (DNA OR RNA); SEVERE ACUTE RESPIRATORY SYNDROME CORONAVIRUS 2 (SARS-COV-2) (CORONAVIRUS DISEASE [COVID-19]), AMPLIFIED PROBE TECHNIQUE, MAKING USE OF HIGH THROUGHPUT TECHNOLOGIES AS DESCRIBED BY CMS-2020-01-R: HCPCS | Performed by: NURSE PRACTITIONER

## 2021-04-07 LAB — SARS-COV-2 RNA RESP QL NAA+PROBE: NOT DETECTED

## 2021-04-12 ENCOUNTER — LAB VISIT (OUTPATIENT)
Dept: LAB | Facility: OTHER | Age: 61
End: 2021-04-12
Payer: MEDICAID

## 2021-04-12 DIAGNOSIS — Z20.822 ENCOUNTER FOR LABORATORY TESTING FOR COVID-19 VIRUS: ICD-10-CM

## 2021-04-12 PROCEDURE — U0003 INFECTIOUS AGENT DETECTION BY NUCLEIC ACID (DNA OR RNA); SEVERE ACUTE RESPIRATORY SYNDROME CORONAVIRUS 2 (SARS-COV-2) (CORONAVIRUS DISEASE [COVID-19]), AMPLIFIED PROBE TECHNIQUE, MAKING USE OF HIGH THROUGHPUT TECHNOLOGIES AS DESCRIBED BY CMS-2020-01-R: HCPCS | Performed by: NURSE PRACTITIONER

## 2021-04-13 LAB — SARS-COV-2 RNA RESP QL NAA+PROBE: NOT DETECTED

## 2021-04-19 ENCOUNTER — LAB VISIT (OUTPATIENT)
Dept: LAB | Facility: OTHER | Age: 61
End: 2021-04-19
Payer: MEDICAID

## 2021-04-19 DIAGNOSIS — Z20.822 ENCOUNTER FOR LABORATORY TESTING FOR COVID-19 VIRUS: ICD-10-CM

## 2021-04-19 PROCEDURE — U0003 INFECTIOUS AGENT DETECTION BY NUCLEIC ACID (DNA OR RNA); SEVERE ACUTE RESPIRATORY SYNDROME CORONAVIRUS 2 (SARS-COV-2) (CORONAVIRUS DISEASE [COVID-19]), AMPLIFIED PROBE TECHNIQUE, MAKING USE OF HIGH THROUGHPUT TECHNOLOGIES AS DESCRIBED BY CMS-2020-01-R: HCPCS | Performed by: NURSE PRACTITIONER

## 2021-04-20 LAB — SARS-COV-2 RNA RESP QL NAA+PROBE: NOT DETECTED

## 2021-04-26 ENCOUNTER — LAB VISIT (OUTPATIENT)
Dept: LAB | Facility: OTHER | Age: 61
End: 2021-04-26
Payer: MEDICAID

## 2021-04-26 DIAGNOSIS — Z20.822 ENCOUNTER FOR LABORATORY TESTING FOR COVID-19 VIRUS: ICD-10-CM

## 2021-04-26 PROCEDURE — U0003 INFECTIOUS AGENT DETECTION BY NUCLEIC ACID (DNA OR RNA); SEVERE ACUTE RESPIRATORY SYNDROME CORONAVIRUS 2 (SARS-COV-2) (CORONAVIRUS DISEASE [COVID-19]), AMPLIFIED PROBE TECHNIQUE, MAKING USE OF HIGH THROUGHPUT TECHNOLOGIES AS DESCRIBED BY CMS-2020-01-R: HCPCS | Performed by: NURSE PRACTITIONER

## 2021-04-27 LAB — SARS-COV-2 RNA RESP QL NAA+PROBE: NOT DETECTED

## 2021-07-05 ENCOUNTER — LAB VISIT (OUTPATIENT)
Dept: LAB | Facility: OTHER | Age: 61
End: 2021-07-05
Payer: MEDICAID

## 2021-07-05 DIAGNOSIS — Z20.822 ENCOUNTER FOR LABORATORY TESTING FOR COVID-19 VIRUS: ICD-10-CM

## 2021-07-05 PROCEDURE — U0003 INFECTIOUS AGENT DETECTION BY NUCLEIC ACID (DNA OR RNA); SEVERE ACUTE RESPIRATORY SYNDROME CORONAVIRUS 2 (SARS-COV-2) (CORONAVIRUS DISEASE [COVID-19]), AMPLIFIED PROBE TECHNIQUE, MAKING USE OF HIGH THROUGHPUT TECHNOLOGIES AS DESCRIBED BY CMS-2020-01-R: HCPCS | Performed by: NURSE PRACTITIONER

## 2021-07-06 LAB — SARS-COV-2 RNA RESP QL NAA+PROBE: NOT DETECTED

## 2021-07-12 ENCOUNTER — LAB VISIT (OUTPATIENT)
Dept: LAB | Facility: OTHER | Age: 61
End: 2021-07-12
Payer: MEDICAID

## 2021-07-12 DIAGNOSIS — Z20.822 ENCOUNTER FOR LABORATORY TESTING FOR COVID-19 VIRUS: ICD-10-CM

## 2021-07-12 PROCEDURE — U0003 INFECTIOUS AGENT DETECTION BY NUCLEIC ACID (DNA OR RNA); SEVERE ACUTE RESPIRATORY SYNDROME CORONAVIRUS 2 (SARS-COV-2) (CORONAVIRUS DISEASE [COVID-19]), AMPLIFIED PROBE TECHNIQUE, MAKING USE OF HIGH THROUGHPUT TECHNOLOGIES AS DESCRIBED BY CMS-2020-01-R: HCPCS | Performed by: NURSE PRACTITIONER

## 2021-07-13 LAB
SARS-COV-2 RNA RESP QL NAA+PROBE: NOT DETECTED
SARS-COV-2- CYCLE NUMBER: -1

## 2021-07-19 ENCOUNTER — LAB VISIT (OUTPATIENT)
Dept: LAB | Facility: OTHER | Age: 61
End: 2021-07-19
Payer: MEDICAID

## 2021-07-19 DIAGNOSIS — Z20.822 ENCOUNTER FOR LABORATORY TESTING FOR COVID-19 VIRUS: ICD-10-CM

## 2021-07-19 PROCEDURE — U0003 INFECTIOUS AGENT DETECTION BY NUCLEIC ACID (DNA OR RNA); SEVERE ACUTE RESPIRATORY SYNDROME CORONAVIRUS 2 (SARS-COV-2) (CORONAVIRUS DISEASE [COVID-19]), AMPLIFIED PROBE TECHNIQUE, MAKING USE OF HIGH THROUGHPUT TECHNOLOGIES AS DESCRIBED BY CMS-2020-01-R: HCPCS | Performed by: NURSE PRACTITIONER

## 2021-07-20 LAB
SARS-COV-2 RNA RESP QL NAA+PROBE: NOT DETECTED
SARS-COV-2- CYCLE NUMBER: -1

## 2021-07-26 ENCOUNTER — LAB VISIT (OUTPATIENT)
Dept: LAB | Facility: OTHER | Age: 61
End: 2021-07-26
Payer: MEDICAID

## 2021-07-26 DIAGNOSIS — Z20.822 ENCOUNTER FOR LABORATORY TESTING FOR COVID-19 VIRUS: ICD-10-CM

## 2021-07-26 PROCEDURE — U0003 INFECTIOUS AGENT DETECTION BY NUCLEIC ACID (DNA OR RNA); SEVERE ACUTE RESPIRATORY SYNDROME CORONAVIRUS 2 (SARS-COV-2) (CORONAVIRUS DISEASE [COVID-19]), AMPLIFIED PROBE TECHNIQUE, MAKING USE OF HIGH THROUGHPUT TECHNOLOGIES AS DESCRIBED BY CMS-2020-01-R: HCPCS | Performed by: NURSE PRACTITIONER

## 2021-07-28 LAB
SARS-COV-2 RNA RESP QL NAA+PROBE: NOT DETECTED
SARS-COV-2- CYCLE NUMBER: -1

## 2021-08-02 ENCOUNTER — LAB VISIT (OUTPATIENT)
Dept: LAB | Facility: OTHER | Age: 61
End: 2021-08-02
Payer: MEDICAID

## 2021-08-02 DIAGNOSIS — Z20.822 ENCOUNTER FOR LABORATORY TESTING FOR COVID-19 VIRUS: ICD-10-CM

## 2021-08-02 PROCEDURE — U0003 INFECTIOUS AGENT DETECTION BY NUCLEIC ACID (DNA OR RNA); SEVERE ACUTE RESPIRATORY SYNDROME CORONAVIRUS 2 (SARS-COV-2) (CORONAVIRUS DISEASE [COVID-19]), AMPLIFIED PROBE TECHNIQUE, MAKING USE OF HIGH THROUGHPUT TECHNOLOGIES AS DESCRIBED BY CMS-2020-01-R: HCPCS | Performed by: NURSE PRACTITIONER

## 2021-08-04 LAB
SARS-COV-2 RNA RESP QL NAA+PROBE: NOT DETECTED
SARS-COV-2- CYCLE NUMBER: -1

## 2021-08-09 ENCOUNTER — LAB VISIT (OUTPATIENT)
Dept: LAB | Facility: OTHER | Age: 61
End: 2021-08-09
Payer: MEDICAID

## 2021-08-09 DIAGNOSIS — Z20.822 ENCOUNTER FOR LABORATORY TESTING FOR COVID-19 VIRUS: ICD-10-CM

## 2021-08-09 PROCEDURE — U0003 INFECTIOUS AGENT DETECTION BY NUCLEIC ACID (DNA OR RNA); SEVERE ACUTE RESPIRATORY SYNDROME CORONAVIRUS 2 (SARS-COV-2) (CORONAVIRUS DISEASE [COVID-19]), AMPLIFIED PROBE TECHNIQUE, MAKING USE OF HIGH THROUGHPUT TECHNOLOGIES AS DESCRIBED BY CMS-2020-01-R: HCPCS | Performed by: NURSE PRACTITIONER

## 2021-08-11 LAB
SARS-COV-2 RNA RESP QL NAA+PROBE: NOT DETECTED
SARS-COV-2- CYCLE NUMBER: -1

## 2021-08-16 ENCOUNTER — LAB VISIT (OUTPATIENT)
Dept: LAB | Facility: OTHER | Age: 61
End: 2021-08-16
Payer: MEDICAID

## 2021-08-16 DIAGNOSIS — Z20.822 ENCOUNTER FOR LABORATORY TESTING FOR COVID-19 VIRUS: ICD-10-CM

## 2021-08-16 PROCEDURE — U0003 INFECTIOUS AGENT DETECTION BY NUCLEIC ACID (DNA OR RNA); SEVERE ACUTE RESPIRATORY SYNDROME CORONAVIRUS 2 (SARS-COV-2) (CORONAVIRUS DISEASE [COVID-19]), AMPLIFIED PROBE TECHNIQUE, MAKING USE OF HIGH THROUGHPUT TECHNOLOGIES AS DESCRIBED BY CMS-2020-01-R: HCPCS | Performed by: NURSE PRACTITIONER

## 2021-08-18 LAB
SARS-COV-2 RNA RESP QL NAA+PROBE: NOT DETECTED
SARS-COV-2- CYCLE NUMBER: -1

## 2021-08-23 ENCOUNTER — LAB VISIT (OUTPATIENT)
Dept: LAB | Facility: OTHER | Age: 61
End: 2021-08-23
Payer: MEDICAID

## 2021-08-23 DIAGNOSIS — Z20.822 ENCOUNTER FOR LABORATORY TESTING FOR COVID-19 VIRUS: ICD-10-CM

## 2021-08-23 PROCEDURE — U0003 INFECTIOUS AGENT DETECTION BY NUCLEIC ACID (DNA OR RNA); SEVERE ACUTE RESPIRATORY SYNDROME CORONAVIRUS 2 (SARS-COV-2) (CORONAVIRUS DISEASE [COVID-19]), AMPLIFIED PROBE TECHNIQUE, MAKING USE OF HIGH THROUGHPUT TECHNOLOGIES AS DESCRIBED BY CMS-2020-01-R: HCPCS | Performed by: NURSE PRACTITIONER

## 2021-08-24 LAB
SARS-COV-2 RNA RESP QL NAA+PROBE: NORMAL
TEST PERFORMANCE INFO SPEC: NORMAL

## 2021-09-08 DIAGNOSIS — Z20.822 ENCOUNTER FOR LABORATORY TESTING FOR COVID-19 VIRUS: ICD-10-CM

## 2021-09-13 ENCOUNTER — LAB VISIT (OUTPATIENT)
Dept: LAB | Facility: OTHER | Age: 61
End: 2021-09-13
Payer: MEDICAID

## 2021-09-13 DIAGNOSIS — Z20.822 ENCOUNTER FOR LABORATORY TESTING FOR COVID-19 VIRUS: ICD-10-CM

## 2021-09-13 PROCEDURE — U0003 INFECTIOUS AGENT DETECTION BY NUCLEIC ACID (DNA OR RNA); SEVERE ACUTE RESPIRATORY SYNDROME CORONAVIRUS 2 (SARS-COV-2) (CORONAVIRUS DISEASE [COVID-19]), AMPLIFIED PROBE TECHNIQUE, MAKING USE OF HIGH THROUGHPUT TECHNOLOGIES AS DESCRIBED BY CMS-2020-01-R: HCPCS | Performed by: NURSE PRACTITIONER

## 2021-09-15 LAB
SARS-COV-2 RNA RESP QL NAA+PROBE: NOT DETECTED
SARS-COV-2- CYCLE NUMBER: NORMAL

## 2021-09-20 ENCOUNTER — LAB VISIT (OUTPATIENT)
Dept: LAB | Facility: OTHER | Age: 61
End: 2021-09-20
Payer: MEDICAID

## 2021-09-20 DIAGNOSIS — Z20.822 ENCOUNTER FOR LABORATORY TESTING FOR COVID-19 VIRUS: ICD-10-CM

## 2021-09-20 PROCEDURE — U0003 INFECTIOUS AGENT DETECTION BY NUCLEIC ACID (DNA OR RNA); SEVERE ACUTE RESPIRATORY SYNDROME CORONAVIRUS 2 (SARS-COV-2) (CORONAVIRUS DISEASE [COVID-19]), AMPLIFIED PROBE TECHNIQUE, MAKING USE OF HIGH THROUGHPUT TECHNOLOGIES AS DESCRIBED BY CMS-2020-01-R: HCPCS | Performed by: NURSE PRACTITIONER

## 2021-09-21 LAB
SARS-COV-2 RNA RESP QL NAA+PROBE: NOT DETECTED
SARS-COV-2- CYCLE NUMBER: NORMAL

## 2021-09-27 ENCOUNTER — LAB VISIT (OUTPATIENT)
Dept: LAB | Facility: OTHER | Age: 61
End: 2021-09-27
Payer: MEDICAID

## 2021-09-27 DIAGNOSIS — Z20.822 ENCOUNTER FOR LABORATORY TESTING FOR COVID-19 VIRUS: ICD-10-CM

## 2021-09-27 PROCEDURE — U0003 INFECTIOUS AGENT DETECTION BY NUCLEIC ACID (DNA OR RNA); SEVERE ACUTE RESPIRATORY SYNDROME CORONAVIRUS 2 (SARS-COV-2) (CORONAVIRUS DISEASE [COVID-19]), AMPLIFIED PROBE TECHNIQUE, MAKING USE OF HIGH THROUGHPUT TECHNOLOGIES AS DESCRIBED BY CMS-2020-01-R: HCPCS | Performed by: NURSE PRACTITIONER

## 2021-09-28 LAB
SARS-COV-2 RNA RESP QL NAA+PROBE: NOT DETECTED
SARS-COV-2- CYCLE NUMBER: NORMAL

## 2021-12-17 ENCOUNTER — TELEPHONE (OUTPATIENT)
Dept: NEUROLOGY | Facility: HOSPITAL | Age: 61
End: 2021-12-17
Payer: MEDICAID

## 2021-12-27 ENCOUNTER — LAB VISIT (OUTPATIENT)
Dept: LAB | Facility: OTHER | Age: 61
End: 2021-12-27
Payer: MEDICAID

## 2021-12-27 DIAGNOSIS — Z20.822 ENCOUNTER FOR LABORATORY TESTING FOR COVID-19 VIRUS: ICD-10-CM

## 2021-12-27 LAB
SARS-COV-2 RNA RESP QL NAA+PROBE: NOT DETECTED
SARS-COV-2- CYCLE NUMBER: NORMAL

## 2021-12-27 PROCEDURE — U0003 INFECTIOUS AGENT DETECTION BY NUCLEIC ACID (DNA OR RNA); SEVERE ACUTE RESPIRATORY SYNDROME CORONAVIRUS 2 (SARS-COV-2) (CORONAVIRUS DISEASE [COVID-19]), AMPLIFIED PROBE TECHNIQUE, MAKING USE OF HIGH THROUGHPUT TECHNOLOGIES AS DESCRIBED BY CMS-2020-01-R: HCPCS | Performed by: NURSE PRACTITIONER

## 2022-01-03 ENCOUNTER — LAB VISIT (OUTPATIENT)
Dept: LAB | Facility: OTHER | Age: 62
End: 2022-01-03
Payer: MEDICAID

## 2022-01-03 DIAGNOSIS — Z20.822 ENCOUNTER FOR LABORATORY TESTING FOR COVID-19 VIRUS: ICD-10-CM

## 2022-01-03 PROCEDURE — U0003 INFECTIOUS AGENT DETECTION BY NUCLEIC ACID (DNA OR RNA); SEVERE ACUTE RESPIRATORY SYNDROME CORONAVIRUS 2 (SARS-COV-2) (CORONAVIRUS DISEASE [COVID-19]), AMPLIFIED PROBE TECHNIQUE, MAKING USE OF HIGH THROUGHPUT TECHNOLOGIES AS DESCRIBED BY CMS-2020-01-R: HCPCS | Performed by: NURSE PRACTITIONER

## 2022-01-06 LAB — SARS-COV-2 RNA RESP QL NAA+PROBE: NOT DETECTED

## 2022-01-10 ENCOUNTER — LAB VISIT (OUTPATIENT)
Dept: LAB | Facility: OTHER | Age: 62
End: 2022-01-10
Payer: MEDICAID

## 2022-01-10 DIAGNOSIS — Z20.822 ENCOUNTER FOR LABORATORY TESTING FOR COVID-19 VIRUS: ICD-10-CM

## 2022-01-10 PROCEDURE — U0003 INFECTIOUS AGENT DETECTION BY NUCLEIC ACID (DNA OR RNA); SEVERE ACUTE RESPIRATORY SYNDROME CORONAVIRUS 2 (SARS-COV-2) (CORONAVIRUS DISEASE [COVID-19]), AMPLIFIED PROBE TECHNIQUE, MAKING USE OF HIGH THROUGHPUT TECHNOLOGIES AS DESCRIBED BY CMS-2020-01-R: HCPCS | Performed by: NURSE PRACTITIONER

## 2022-01-11 LAB
SARS-COV-2 RNA RESP QL NAA+PROBE: NOT DETECTED
SARS-COV-2- CYCLE NUMBER: NORMAL

## 2022-01-24 ENCOUNTER — LAB VISIT (OUTPATIENT)
Dept: LAB | Facility: OTHER | Age: 62
End: 2022-01-24
Payer: MEDICAID

## 2022-01-24 DIAGNOSIS — Z20.822 ENCOUNTER FOR LABORATORY TESTING FOR COVID-19 VIRUS: ICD-10-CM

## 2022-01-24 PROCEDURE — U0003 INFECTIOUS AGENT DETECTION BY NUCLEIC ACID (DNA OR RNA); SEVERE ACUTE RESPIRATORY SYNDROME CORONAVIRUS 2 (SARS-COV-2) (CORONAVIRUS DISEASE [COVID-19]), AMPLIFIED PROBE TECHNIQUE, MAKING USE OF HIGH THROUGHPUT TECHNOLOGIES AS DESCRIBED BY CMS-2020-01-R: HCPCS | Performed by: EMERGENCY MEDICINE

## 2022-01-25 LAB
SARS-COV-2 RNA RESP QL NAA+PROBE: NOT DETECTED
SARS-COV-2- CYCLE NUMBER: NORMAL

## 2022-02-07 ENCOUNTER — LAB VISIT (OUTPATIENT)
Dept: LAB | Facility: OTHER | Age: 62
End: 2022-02-07
Payer: MEDICAID

## 2022-02-07 DIAGNOSIS — Z20.822 ENCOUNTER FOR LABORATORY TESTING FOR COVID-19 VIRUS: ICD-10-CM

## 2022-02-07 PROCEDURE — U0003 INFECTIOUS AGENT DETECTION BY NUCLEIC ACID (DNA OR RNA); SEVERE ACUTE RESPIRATORY SYNDROME CORONAVIRUS 2 (SARS-COV-2) (CORONAVIRUS DISEASE [COVID-19]), AMPLIFIED PROBE TECHNIQUE, MAKING USE OF HIGH THROUGHPUT TECHNOLOGIES AS DESCRIBED BY CMS-2020-01-R: HCPCS | Performed by: EMERGENCY MEDICINE

## 2022-02-08 LAB
SARS-COV-2 RNA RESP QL NAA+PROBE: NOT DETECTED
SARS-COV-2- CYCLE NUMBER: NORMAL

## 2022-02-14 ENCOUNTER — LAB VISIT (OUTPATIENT)
Dept: LAB | Facility: OTHER | Age: 62
End: 2022-02-14
Payer: MEDICAID

## 2022-02-14 DIAGNOSIS — Z20.822 ENCOUNTER FOR LABORATORY TESTING FOR COVID-19 VIRUS: ICD-10-CM

## 2022-02-14 PROCEDURE — U0003 INFECTIOUS AGENT DETECTION BY NUCLEIC ACID (DNA OR RNA); SEVERE ACUTE RESPIRATORY SYNDROME CORONAVIRUS 2 (SARS-COV-2) (CORONAVIRUS DISEASE [COVID-19]), AMPLIFIED PROBE TECHNIQUE, MAKING USE OF HIGH THROUGHPUT TECHNOLOGIES AS DESCRIBED BY CMS-2020-01-R: HCPCS | Performed by: EMERGENCY MEDICINE

## 2022-02-15 DIAGNOSIS — U07.1 COVID-19 VIRUS DETECTED: ICD-10-CM

## 2022-02-15 LAB
SARS-COV-2 RNA RESP QL NAA+PROBE: DETECTED
SARS-COV-2- CYCLE NUMBER: 29

## 2022-05-09 ENCOUNTER — LAB VISIT (OUTPATIENT)
Dept: LAB | Facility: OTHER | Age: 62
End: 2022-05-09
Payer: MEDICAID

## 2022-05-09 DIAGNOSIS — Z20.822 ENCOUNTER FOR LABORATORY TESTING FOR COVID-19 VIRUS: ICD-10-CM

## 2022-05-09 PROCEDURE — U0003 INFECTIOUS AGENT DETECTION BY NUCLEIC ACID (DNA OR RNA); SEVERE ACUTE RESPIRATORY SYNDROME CORONAVIRUS 2 (SARS-COV-2) (CORONAVIRUS DISEASE [COVID-19]), AMPLIFIED PROBE TECHNIQUE, MAKING USE OF HIGH THROUGHPUT TECHNOLOGIES AS DESCRIBED BY CMS-2020-01-R: HCPCS | Performed by: EMERGENCY MEDICINE

## 2022-05-10 LAB
SARS-COV-2 RNA RESP QL NAA+PROBE: NOT DETECTED
SARS-COV-2- CYCLE NUMBER: NORMAL

## 2022-12-29 ENCOUNTER — HOSPITAL ENCOUNTER (INPATIENT)
Facility: HOSPITAL | Age: 62
LOS: 5 days | Discharge: NURSING, REHAB OR LONG-TERM CARE | DRG: 368 | End: 2023-01-03
Attending: EMERGENCY MEDICINE | Admitting: STUDENT IN AN ORGANIZED HEALTH CARE EDUCATION/TRAINING PROGRAM
Payer: MEDICAID

## 2022-12-29 DIAGNOSIS — K31.1 PARTIAL GASTRIC OUTLET OBSTRUCTION: ICD-10-CM

## 2022-12-29 DIAGNOSIS — N39.0 URINARY TRACT INFECTION ASSOCIATED WITH INDWELLING URETHRAL CATHETER, INITIAL ENCOUNTER: Primary | ICD-10-CM

## 2022-12-29 DIAGNOSIS — K92.2 GASTROINTESTINAL HEMORRHAGE, UNSPECIFIED GASTROINTESTINAL HEMORRHAGE TYPE: ICD-10-CM

## 2022-12-29 DIAGNOSIS — R11.10 VOMITING, UNSPECIFIED VOMITING TYPE, UNSPECIFIED WHETHER NAUSEA PRESENT: ICD-10-CM

## 2022-12-29 DIAGNOSIS — K59.00 CONSTIPATION, UNSPECIFIED CONSTIPATION TYPE: ICD-10-CM

## 2022-12-29 DIAGNOSIS — Z46.59 ENCOUNTER FOR NASOGASTRIC (NG) TUBE PLACEMENT: ICD-10-CM

## 2022-12-29 DIAGNOSIS — K92.2 GI BLEED: ICD-10-CM

## 2022-12-29 DIAGNOSIS — R10.13 EPIGASTRIC ABDOMINAL PAIN: ICD-10-CM

## 2022-12-29 DIAGNOSIS — R11.10 VOMITING: ICD-10-CM

## 2022-12-29 DIAGNOSIS — K59.04 CHRONIC IDIOPATHIC CONSTIPATION: ICD-10-CM

## 2022-12-29 DIAGNOSIS — T83.511A URINARY TRACT INFECTION ASSOCIATED WITH INDWELLING URETHRAL CATHETER, INITIAL ENCOUNTER: Primary | ICD-10-CM

## 2022-12-29 DIAGNOSIS — K92.0 GASTROINTESTINAL HEMORRHAGE WITH HEMATEMESIS: ICD-10-CM

## 2022-12-29 DIAGNOSIS — R00.2 PALPITATIONS: ICD-10-CM

## 2022-12-29 PROBLEM — I10 HTN (HYPERTENSION): Status: ACTIVE | Noted: 2022-12-29

## 2022-12-29 PROBLEM — M62.838 MUSCLE SPASMS OF BOTH LOWER EXTREMITIES: Status: ACTIVE | Noted: 2022-12-29

## 2022-12-29 LAB
ABO + RH BLD: NORMAL
ALBUMIN SERPL BCP-MCNC: 3.8 G/DL (ref 3.5–5.2)
ALP SERPL-CCNC: 82 U/L (ref 55–135)
ALT SERPL W/O P-5'-P-CCNC: 21 U/L (ref 10–44)
ANION GAP SERPL CALC-SCNC: 19 MMOL/L (ref 8–16)
AST SERPL-CCNC: 25 U/L (ref 10–40)
BACTERIA #/AREA URNS HPF: ABNORMAL /HPF
BASOPHILS # BLD AUTO: 0.04 K/UL (ref 0–0.2)
BASOPHILS NFR BLD: 0.3 % (ref 0–1.9)
BILIRUB SERPL-MCNC: 0.5 MG/DL (ref 0.1–1)
BILIRUB UR QL STRIP: NEGATIVE
BLD GP AB SCN CELLS X3 SERPL QL: NORMAL
BUN SERPL-MCNC: 60 MG/DL (ref 6–20)
CALCIUM SERPL-MCNC: 9.5 MG/DL (ref 8.7–10.5)
CHLORIDE SERPL-SCNC: 86 MMOL/L (ref 95–110)
CLARITY UR: ABNORMAL
CO2 SERPL-SCNC: 39 MMOL/L (ref 23–29)
COLOR UR: YELLOW
CREAT SERPL-MCNC: 1 MG/DL (ref 0.5–1.4)
DIFFERENTIAL METHOD: ABNORMAL
EOSINOPHIL # BLD AUTO: 0 K/UL (ref 0–0.5)
EOSINOPHIL NFR BLD: 0 % (ref 0–8)
ERYTHROCYTE [DISTWIDTH] IN BLOOD BY AUTOMATED COUNT: 13.6 % (ref 11.5–14.5)
EST. GFR  (NO RACE VARIABLE): >60 ML/MIN/1.73 M^2
GLUCOSE SERPL-MCNC: 129 MG/DL (ref 70–110)
GLUCOSE UR QL STRIP: NEGATIVE
HCT VFR BLD AUTO: 44.3 % (ref 40–54)
HGB BLD-MCNC: 13.9 G/DL (ref 14–18)
HGB UR QL STRIP: ABNORMAL
HYALINE CASTS #/AREA URNS LPF: 0 /LPF
IMM GRANULOCYTES # BLD AUTO: 0.05 K/UL (ref 0–0.04)
IMM GRANULOCYTES NFR BLD AUTO: 0.3 % (ref 0–0.5)
INR PPP: 1 (ref 0.8–1.2)
KETONES UR QL STRIP: NEGATIVE
LACTATE SERPL-SCNC: 1.9 MMOL/L (ref 0.5–2.2)
LEUKOCYTE ESTERASE UR QL STRIP: ABNORMAL
LIPASE SERPL-CCNC: 32 U/L (ref 4–60)
LYMPHOCYTES # BLD AUTO: 1.5 K/UL (ref 1–4.8)
LYMPHOCYTES NFR BLD: 9.8 % (ref 18–48)
MCH RBC QN AUTO: 28.4 PG (ref 27–31)
MCHC RBC AUTO-ENTMCNC: 31.4 G/DL (ref 32–36)
MCV RBC AUTO: 91 FL (ref 82–98)
MICROSCOPIC COMMENT: ABNORMAL
MONOCYTES # BLD AUTO: 0.5 K/UL (ref 0.3–1)
MONOCYTES NFR BLD: 3.4 % (ref 4–15)
NEUTROPHILS # BLD AUTO: 13.4 K/UL (ref 1.8–7.7)
NEUTROPHILS NFR BLD: 86.2 % (ref 38–73)
NITRITE UR QL STRIP: NEGATIVE
NRBC BLD-RTO: 0 /100 WBC
PH UR STRIP: 7 [PH] (ref 5–8)
PLATELET # BLD AUTO: 321 K/UL (ref 150–450)
PMV BLD AUTO: 9.3 FL (ref 9.2–12.9)
POTASSIUM SERPL-SCNC: 3.5 MMOL/L (ref 3.5–5.1)
PROT SERPL-MCNC: 7.9 G/DL (ref 6–8.4)
PROT UR QL STRIP: ABNORMAL
PROTHROMBIN TIME: 10.3 SEC (ref 9–12.5)
RBC # BLD AUTO: 4.89 M/UL (ref 4.6–6.2)
RBC #/AREA URNS HPF: 74 /HPF (ref 0–4)
SODIUM SERPL-SCNC: 144 MMOL/L (ref 136–145)
SP GR UR STRIP: 1.02 (ref 1–1.03)
TROPONIN I SERPL DL<=0.01 NG/ML-MCNC: <0.006 NG/ML (ref 0–0.03)
UNIDENT CRYS URNS QL MICRO: 21
URN SPEC COLLECT METH UR: ABNORMAL
UROBILINOGEN UR STRIP-ACNC: NEGATIVE EU/DL
WBC # BLD AUTO: 15.54 K/UL (ref 3.9–12.7)
WBC #/AREA URNS HPF: >100 /HPF (ref 0–5)
WBC CLUMPS URNS QL MICRO: ABNORMAL

## 2022-12-29 PROCEDURE — 63600175 PHARM REV CODE 636 W HCPCS: Performed by: EMERGENCY MEDICINE

## 2022-12-29 PROCEDURE — C9113 INJ PANTOPRAZOLE SODIUM, VIA: HCPCS | Performed by: EMERGENCY MEDICINE

## 2022-12-29 PROCEDURE — 99205 PR OFFICE/OUTPT VISIT, NEW, LEVL V, 60-74 MIN: ICD-10-PCS | Mod: ,,, | Performed by: INTERNAL MEDICINE

## 2022-12-29 PROCEDURE — 93005 ELECTROCARDIOGRAM TRACING: CPT

## 2022-12-29 PROCEDURE — G0378 HOSPITAL OBSERVATION PER HR: HCPCS

## 2022-12-29 PROCEDURE — 87186 SC STD MICRODIL/AGAR DIL: CPT | Performed by: EMERGENCY MEDICINE

## 2022-12-29 PROCEDURE — 99205 OFFICE O/P NEW HI 60 MIN: CPT | Mod: ,,, | Performed by: INTERNAL MEDICINE

## 2022-12-29 PROCEDURE — 96365 THER/PROPH/DIAG IV INF INIT: CPT

## 2022-12-29 PROCEDURE — 81000 URINALYSIS NONAUTO W/SCOPE: CPT | Performed by: EMERGENCY MEDICINE

## 2022-12-29 PROCEDURE — 99285 EMERGENCY DEPT VISIT HI MDM: CPT | Mod: 25

## 2022-12-29 PROCEDURE — 83690 ASSAY OF LIPASE: CPT | Performed by: EMERGENCY MEDICINE

## 2022-12-29 PROCEDURE — 85610 PROTHROMBIN TIME: CPT | Performed by: EMERGENCY MEDICINE

## 2022-12-29 PROCEDURE — 25500020 PHARM REV CODE 255: Performed by: EMERGENCY MEDICINE

## 2022-12-29 PROCEDURE — 63600175 PHARM REV CODE 636 W HCPCS

## 2022-12-29 PROCEDURE — 86900 BLOOD TYPING SEROLOGIC ABO: CPT | Performed by: EMERGENCY MEDICINE

## 2022-12-29 PROCEDURE — 83605 ASSAY OF LACTIC ACID: CPT

## 2022-12-29 PROCEDURE — 96375 TX/PRO/DX INJ NEW DRUG ADDON: CPT

## 2022-12-29 PROCEDURE — 99900035 HC TECH TIME PER 15 MIN (STAT)

## 2022-12-29 PROCEDURE — 85025 COMPLETE CBC W/AUTO DIFF WBC: CPT | Performed by: EMERGENCY MEDICINE

## 2022-12-29 PROCEDURE — 11000001 HC ACUTE MED/SURG PRIVATE ROOM

## 2022-12-29 PROCEDURE — 93010 EKG 12-LEAD: ICD-10-PCS | Mod: ,,, | Performed by: INTERNAL MEDICINE

## 2022-12-29 PROCEDURE — 87077 CULTURE AEROBIC IDENTIFY: CPT | Mod: 59 | Performed by: EMERGENCY MEDICINE

## 2022-12-29 PROCEDURE — 82803 BLOOD GASES ANY COMBINATION: CPT

## 2022-12-29 PROCEDURE — C9113 INJ PANTOPRAZOLE SODIUM, VIA: HCPCS

## 2022-12-29 PROCEDURE — 25000003 PHARM REV CODE 250

## 2022-12-29 PROCEDURE — 84484 ASSAY OF TROPONIN QUANT: CPT | Performed by: EMERGENCY MEDICINE

## 2022-12-29 PROCEDURE — 87040 BLOOD CULTURE FOR BACTERIA: CPT | Mod: 59 | Performed by: STUDENT IN AN ORGANIZED HEALTH CARE EDUCATION/TRAINING PROGRAM

## 2022-12-29 PROCEDURE — 25000003 PHARM REV CODE 250: Performed by: EMERGENCY MEDICINE

## 2022-12-29 PROCEDURE — 87086 URINE CULTURE/COLONY COUNT: CPT | Performed by: EMERGENCY MEDICINE

## 2022-12-29 PROCEDURE — 80053 COMPREHEN METABOLIC PANEL: CPT | Performed by: EMERGENCY MEDICINE

## 2022-12-29 PROCEDURE — 94761 N-INVAS EAR/PLS OXIMETRY MLT: CPT

## 2022-12-29 PROCEDURE — 93010 ELECTROCARDIOGRAM REPORT: CPT | Mod: ,,, | Performed by: INTERNAL MEDICINE

## 2022-12-29 PROCEDURE — 87088 URINE BACTERIA CULTURE: CPT | Performed by: EMERGENCY MEDICINE

## 2022-12-29 RX ORDER — LISINOPRIL 5 MG/1
5 TABLET ORAL DAILY
Status: DISCONTINUED | OUTPATIENT
Start: 2022-12-30 | End: 2022-12-29

## 2022-12-29 RX ORDER — TIZANIDINE HYDROCHLORIDE 6 MG/1
6 CAPSULE, GELATIN COATED ORAL EVERY MORNING
COMMUNITY
Start: 2022-10-27

## 2022-12-29 RX ORDER — IBUPROFEN 200 MG
1 TABLET ORAL DAILY
COMMUNITY

## 2022-12-29 RX ORDER — TAMSULOSIN HYDROCHLORIDE 0.4 MG/1
0.4 CAPSULE ORAL NIGHTLY
Status: DISCONTINUED | OUTPATIENT
Start: 2022-12-29 | End: 2023-01-03 | Stop reason: HOSPADM

## 2022-12-29 RX ORDER — BISACODYL 10 MG
10 SUPPOSITORY, RECTAL RECTAL
COMMUNITY
Start: 2021-12-30 | End: 2022-12-29

## 2022-12-29 RX ORDER — DOCUSATE SODIUM 100 MG/1
100 CAPSULE, LIQUID FILLED ORAL 2 TIMES DAILY
COMMUNITY

## 2022-12-29 RX ORDER — CLONAZEPAM 2 MG/1
2 TABLET ORAL
COMMUNITY
Start: 2021-11-17 | End: 2022-12-29

## 2022-12-29 RX ORDER — METHENAMINE HIPPURATE 1000 MG/1
1 TABLET ORAL
COMMUNITY
Start: 2020-04-18 | End: 2022-12-29

## 2022-12-29 RX ORDER — FUROSEMIDE 20 MG/1
20 TABLET ORAL EVERY OTHER DAY
Status: DISCONTINUED | OUTPATIENT
Start: 2022-12-30 | End: 2022-12-31

## 2022-12-29 RX ORDER — LANOLIN ALCOHOL/MO/W.PET/CERES
400 CREAM (GRAM) TOPICAL DAILY
COMMUNITY
Start: 2020-07-07

## 2022-12-29 RX ORDER — POLYETHYLENE GLYCOL 3350 17 G/17G
17 POWDER, FOR SOLUTION ORAL DAILY
Status: DISCONTINUED | OUTPATIENT
Start: 2022-12-29 | End: 2023-01-03 | Stop reason: HOSPADM

## 2022-12-29 RX ORDER — SODIUM CHLORIDE, SODIUM LACTATE, POTASSIUM CHLORIDE, CALCIUM CHLORIDE 600; 310; 30; 20 MG/100ML; MG/100ML; MG/100ML; MG/100ML
INJECTION, SOLUTION INTRAVENOUS CONTINUOUS
Status: DISCONTINUED | OUTPATIENT
Start: 2022-12-29 | End: 2022-12-31

## 2022-12-29 RX ORDER — FERROUS SULFATE 325(65) MG
325 TABLET ORAL DAILY
COMMUNITY

## 2022-12-29 RX ORDER — TRAZODONE HYDROCHLORIDE 50 MG/1
100 TABLET ORAL
COMMUNITY
End: 2022-12-29 | Stop reason: DRUGHIGH

## 2022-12-29 RX ORDER — TIZANIDINE HYDROCHLORIDE 6 MG/1
12 CAPSULE, GELATIN COATED ORAL NIGHTLY
COMMUNITY

## 2022-12-29 RX ORDER — BACLOFEN 10 MG/1
20 TABLET ORAL NIGHTLY
Status: DISCONTINUED | OUTPATIENT
Start: 2022-12-29 | End: 2023-01-03 | Stop reason: HOSPADM

## 2022-12-29 RX ORDER — DILTIAZEM HYDROCHLORIDE 30 MG/1
60 TABLET, FILM COATED ORAL 2 TIMES DAILY
Status: DISCONTINUED | OUTPATIENT
Start: 2022-12-29 | End: 2023-01-03 | Stop reason: HOSPADM

## 2022-12-29 RX ORDER — SENNOSIDES 8.6 MG/1
2 TABLET ORAL NIGHTLY PRN
COMMUNITY
Start: 2020-07-07

## 2022-12-29 RX ORDER — PANTOPRAZOLE SODIUM 40 MG/10ML
80 INJECTION, POWDER, LYOPHILIZED, FOR SOLUTION INTRAVENOUS
Status: COMPLETED | OUTPATIENT
Start: 2022-12-29 | End: 2022-12-29

## 2022-12-29 RX ORDER — DILTIAZEM HYDROCHLORIDE 120 MG/1
120 CAPSULE, COATED, EXTENDED RELEASE ORAL
COMMUNITY
End: 2022-12-29 | Stop reason: DRUGHIGH

## 2022-12-29 RX ORDER — DILTIAZEM HYDROCHLORIDE 60 MG/1
60 TABLET, FILM COATED ORAL 2 TIMES DAILY
COMMUNITY

## 2022-12-29 RX ORDER — ONDANSETRON HYDROCHLORIDE 4 MG/5ML
4 SOLUTION ORAL EVERY 6 HOURS PRN
Status: DISCONTINUED | OUTPATIENT
Start: 2022-12-29 | End: 2023-01-02

## 2022-12-29 RX ORDER — FUROSEMIDE 20 MG/1
20 TABLET ORAL EVERY OTHER DAY
COMMUNITY

## 2022-12-29 RX ORDER — MULTIVITAMIN
1 TABLET ORAL DAILY
COMMUNITY

## 2022-12-29 RX ORDER — ACETAMINOPHEN 500 MG
5 TABLET ORAL NIGHTLY
COMMUNITY

## 2022-12-29 RX ORDER — ROPINIROLE 4 MG/1
4 TABLET, FILM COATED ORAL NIGHTLY
COMMUNITY

## 2022-12-29 RX ORDER — ACETAMINOPHEN 325 MG/1
650 TABLET ORAL EVERY 6 HOURS PRN
COMMUNITY

## 2022-12-29 RX ORDER — BACLOFEN 20 MG/1
20 TABLET ORAL NIGHTLY
COMMUNITY

## 2022-12-29 RX ORDER — ASPIRIN 81 MG/1
81 TABLET ORAL DAILY
COMMUNITY
End: 2022-12-29

## 2022-12-29 RX ORDER — LIDOCAINE 50 MG/G
1 PATCH TOPICAL DAILY PRN
Status: DISCONTINUED | OUTPATIENT
Start: 2022-12-29 | End: 2023-01-03 | Stop reason: HOSPADM

## 2022-12-29 RX ORDER — AMOXICILLIN 250 MG
1 CAPSULE ORAL 2 TIMES DAILY PRN
Status: DISCONTINUED | OUTPATIENT
Start: 2022-12-29 | End: 2022-12-30

## 2022-12-29 RX ORDER — HYDROCODONE BITARTRATE AND ACETAMINOPHEN 7.5; 325 MG/15ML; MG/15ML
15 SOLUTION ORAL EVERY 8 HOURS PRN
COMMUNITY
Start: 2021-12-30

## 2022-12-29 RX ORDER — ROPINIROLE 1 MG/1
4 TABLET, FILM COATED ORAL NIGHTLY
Status: DISCONTINUED | OUTPATIENT
Start: 2022-12-29 | End: 2023-01-03 | Stop reason: HOSPADM

## 2022-12-29 RX ORDER — GABAPENTIN 600 MG/1
600 TABLET ORAL 3 TIMES DAILY
COMMUNITY

## 2022-12-29 RX ORDER — POTASSIUM CITRATE 10 MEQ/1
TABLET, EXTENDED RELEASE ORAL
COMMUNITY
Start: 2021-02-24 | End: 2022-12-29

## 2022-12-29 RX ORDER — LANOLIN ALCOHOL/MO/W.PET/CERES
400 CREAM (GRAM) TOPICAL DAILY
Status: DISCONTINUED | OUTPATIENT
Start: 2022-12-30 | End: 2023-01-03 | Stop reason: HOSPADM

## 2022-12-29 RX ORDER — BACLOFEN 10 MG/1
10 TABLET ORAL 2 TIMES DAILY
Status: ON HOLD | COMMUNITY
Start: 2022-05-30 | End: 2023-01-03 | Stop reason: HOSPADM

## 2022-12-29 RX ORDER — POLYETHYLENE GLYCOL 3350 17 G/17G
17 POWDER, FOR SOLUTION ORAL DAILY
COMMUNITY
Start: 2020-07-07

## 2022-12-29 RX ORDER — GABAPENTIN 300 MG/1
600 CAPSULE ORAL 3 TIMES DAILY
Status: DISCONTINUED | OUTPATIENT
Start: 2022-12-29 | End: 2023-01-03 | Stop reason: HOSPADM

## 2022-12-29 RX ORDER — TRAZODONE HYDROCHLORIDE 100 MG/1
200 TABLET ORAL NIGHTLY
COMMUNITY
Start: 2021-09-28

## 2022-12-29 RX ORDER — PANTOPRAZOLE SODIUM 40 MG/10ML
40 INJECTION, POWDER, LYOPHILIZED, FOR SOLUTION INTRAVENOUS 2 TIMES DAILY
Status: DISCONTINUED | OUTPATIENT
Start: 2022-12-29 | End: 2023-01-03 | Stop reason: HOSPADM

## 2022-12-29 RX ORDER — LISINOPRIL 5 MG/1
5 TABLET ORAL DAILY
COMMUNITY
Start: 2021-09-28

## 2022-12-29 RX ORDER — ACETAMINOPHEN 325 MG/1
650 TABLET ORAL EVERY 6 HOURS PRN
Status: DISCONTINUED | OUTPATIENT
Start: 2022-12-29 | End: 2023-01-03 | Stop reason: HOSPADM

## 2022-12-29 RX ORDER — TALC
6 POWDER (GRAM) TOPICAL NIGHTLY
Status: DISCONTINUED | OUTPATIENT
Start: 2022-12-29 | End: 2023-01-03 | Stop reason: HOSPADM

## 2022-12-29 RX ORDER — GUAIFENESIN 100 MG/5ML
200 SOLUTION ORAL EVERY 4 HOURS PRN
Status: ON HOLD | COMMUNITY
End: 2023-01-03 | Stop reason: HOSPADM

## 2022-12-29 RX ORDER — TAMSULOSIN HYDROCHLORIDE 0.4 MG/1
0.4 CAPSULE ORAL NIGHTLY
COMMUNITY

## 2022-12-29 RX ORDER — TALC
9 POWDER (GRAM) TOPICAL NIGHTLY PRN
Status: DISCONTINUED | OUTPATIENT
Start: 2022-12-29 | End: 2022-12-29

## 2022-12-29 RX ORDER — ONDANSETRON 4 MG/1
4 TABLET, FILM COATED ORAL EVERY 6 HOURS PRN
COMMUNITY

## 2022-12-29 RX ORDER — GUAIFENESIN 600 MG/1
600 TABLET, EXTENDED RELEASE ORAL 2 TIMES DAILY PRN
COMMUNITY
Start: 2021-11-10 | End: 2024-11-07

## 2022-12-29 RX ORDER — DOCUSATE SODIUM 100 MG/1
100 CAPSULE, LIQUID FILLED ORAL 2 TIMES DAILY
Status: DISCONTINUED | OUTPATIENT
Start: 2022-12-29 | End: 2023-01-02

## 2022-12-29 RX ORDER — ACETAMINOPHEN 325 MG/1
650 TABLET ORAL EVERY 8 HOURS PRN
Status: DISCONTINUED | OUTPATIENT
Start: 2022-12-29 | End: 2022-12-29 | Stop reason: SDUPTHER

## 2022-12-29 RX ORDER — SODIUM CHLORIDE 0.9 % (FLUSH) 0.9 %
5 SYRINGE (ML) INJECTION
Status: DISCONTINUED | OUTPATIENT
Start: 2022-12-29 | End: 2023-01-03 | Stop reason: HOSPADM

## 2022-12-29 RX ADMIN — PANTOPRAZOLE SODIUM 80 MG: 40 INJECTION, POWDER, LYOPHILIZED, FOR SOLUTION INTRAVENOUS at 12:12

## 2022-12-29 RX ADMIN — ROPINIROLE HYDROCHLORIDE 4 MG: 1 TABLET, FILM COATED ORAL at 08:12

## 2022-12-29 RX ADMIN — IOHEXOL 75 ML: 350 INJECTION, SOLUTION INTRAVENOUS at 01:12

## 2022-12-29 RX ADMIN — SODIUM CHLORIDE, SODIUM LACTATE, POTASSIUM CHLORIDE, AND CALCIUM CHLORIDE: .6; .31; .03; .02 INJECTION, SOLUTION INTRAVENOUS at 05:12

## 2022-12-29 RX ADMIN — TAMSULOSIN HYDROCHLORIDE 0.4 MG: 0.4 CAPSULE ORAL at 08:12

## 2022-12-29 RX ADMIN — GABAPENTIN 600 MG: 300 CAPSULE ORAL at 08:12

## 2022-12-29 RX ADMIN — BACLOFEN 20 MG: 10 TABLET ORAL at 08:12

## 2022-12-29 RX ADMIN — SODIUM CHLORIDE 500 ML: 0.9 INJECTION, SOLUTION INTRAVENOUS at 02:12

## 2022-12-29 RX ADMIN — Medication 6 MG: at 08:12

## 2022-12-29 RX ADMIN — CEFTRIAXONE 1 G: 1 INJECTION, SOLUTION INTRAVENOUS at 02:12

## 2022-12-29 RX ADMIN — TIZANIDINE 6 MG: 2 TABLET ORAL at 06:12

## 2022-12-29 RX ADMIN — PANTOPRAZOLE SODIUM 40 MG: 40 INJECTION, POWDER, FOR SOLUTION INTRAVENOUS at 10:12

## 2022-12-29 NOTE — ED NOTES
Present to ED via EMS from [facility] with abd pain and swelling for 2-3 days and n/v that taste like blood for 1 days. Noted swelling to abd. Suprapubic catheter in place. AAO x 4. No distress noted.

## 2022-12-29 NOTE — PHARMACY MED REC
"  [facility]           Pharmacy  Admission Medication History     The home medication history was taken by Kira Lee.      Medication history obtained from Medications listed below were obtained from: Nursing home    Based on information gathered for medication list, you may go to "Admission" then "Reconcile Home Medications" tabs to review and/or act upon those items.     The home medication list has been updated by the Pharmacy department.   Please read ALL comments highlighted in yellow.   Please address this information as you see fit.    Feel free to contact us if you have any questions or require assistance.    The medications listed below were removed from the home medication list.  Please reorder if appropriate:    Patient reports NOT TAKING the following medication(s):  Dulcolax 10mg supp  Klonopin 2mg  Hiprex 1g  Potassium citrate 10meq      No current facility-administered medications on file prior to encounter.     Current Outpatient Medications on File Prior to Encounter   Medication Sig Dispense Refill    acetaminophen (TYLENOL) 325 MG tablet Take 650 mg by mouth every 6 (six) hours as needed for Temperature greater than (100.5).      aspirin (ECOTRIN) 81 MG EC tablet Take 81 mg by mouth once daily.      baclofen (LIORESAL) 10 MG tablet Take 10 mg by mouth 2 (two) times a day. Morning and Afternoon      baclofen (LIORESAL) 20 MG tablet Take 20 mg by mouth nightly.      diltiaZEM (CARDIZEM) 60 MG tablet Take 60 mg by mouth 2 (two) times a day.      docusate sodium (COLACE) 100 MG capsule Take 100 mg by mouth 2 (two) times daily.      ferrous sulfate (FEOSOL) 325 mg (65 mg iron) Tab tablet Take 325 mg by mouth once daily.      furosemide (LASIX) 20 MG tablet Take 20 mg by mouth every other day.      gabapentin (NEURONTIN) 600 MG tablet Take 600 mg by mouth 3 (three) times daily.      guaiFENesin (MUCINEX) 600 mg 12 hr tablet Take 600 mg by mouth 2 (two) times daily as needed " for Cough or Congestion.      guaiFENesin 100 mg/5 ml (ROBITUSSIN) 100 mg/5 mL syrup Take 200 mg by mouth every 4 (four) hours as needed for Cough.      hydrocodone-acetaminophen (HYCET) solution 7.5-325 mg/15mL Take 15 mLs by mouth every 8 (eight) hours as needed for Pain.      lisinopriL (PRINIVIL,ZESTRIL) 5 MG tablet Take 5 mg by mouth once daily.      magnesium oxide (MAG-OX) 400 mg (241.3 mg magnesium) tablet Take 400 mg by mouth once daily. At 12 noon      melatonin (MELATIN) 5 mg Take 5 mg by mouth every evening.      multivitamin (THERAGRAN) per tablet Take 1 tablet by mouth once daily.      nicotine (NICODERM CQ) 14 mg/24 hr Place 1 patch onto the skin once daily.      ondansetron (ZOFRAN) 4 MG tablet Take 4 mg by mouth every 6 (six) hours as needed for Nausea (or vomiting).      polyethylene glycol (GLYCOLAX) 17 gram/dose powder Take 17 g by mouth once daily. In 8 ounces of water      rOPINIRole (REQUIP) 4 MG tablet Take 4 mg by mouth every evening.      senna (SENOKOT) 8.6 mg tablet Take 2 tablets by mouth nightly as needed for Constipation.      sodium chloride 1 gram tablet Take 1 g by mouth once daily.      tamsulosin (FLOMAX) 0.4 mg Cap Take 0.4 mg by mouth every evening.      tiZANidine (ZANAFLEX) 6 mg capsule Take 6 mg by mouth every morning.      tiZANidine (ZANAFLEX) 6 mg capsule Take 12 mg by mouth every evening. 2 capsules      traZODone (DESYREL) 100 MG tablet Take 200 mg by mouth every evening.         Please address this information as you see fit.  Feel free to contact us if you have any questions or require assistance.    Kira Lee  [phone]                .

## 2022-12-29 NOTE — H&P
[facility] Medicine  History & Physical    Patient Name: Shaw Heredia  MRN: [id number]  Patient Class: OP- Observation  Admission Date: 12/29/2022  Attending Physician: Marychuy Brand MD   Primary Care Provider: Primary Doctor No         Patient information was obtained from patient and ER records.     Subjective:     Principal Problem:GI bleed    Chief Complaint:   Chief Complaint   Patient presents with    Abdominal Pain     Pt arrives via EMS from [facility], with complaints of  n/v and abd swelling, EMS reports coffee ground  emesis         HPI: 63 y/o male from [facility], pmhx: dwarfism, neurogenic bladder with suprapubic catheter, quadriplegia (uses wheelchair), restless leg, muscle spasms, HTN, ileus, former etoh user, smoker,  uses home O2 (2lpm).    C/o abdominal bloating for the past 4 days along with coffee-ground and red-streaked emesis for two days (upwards of 10x). Denies bloody/dark stool, GERD, fever, chills, diarrhea,  chest pain, SOB. States last BM was 3 days ago.      In Emergency, 102/69, , 99.1F, SpO2  96% 3lpm. Labs were significant for WBC 15.54, Hgb 13.9 (2mo ago 11.5), Cl- 86, HCO3- 39, BUN 60, Cr wnl, anion gap 19, UA PRO +2, Occult blood +2, Leuks +3, many deann on micro. Trip,  lipase , INR, wnl. CXR showed evidence of distended abd. CT showed Large colonic and rectal stool burden resulting suggesting constipation, with rectal impaction not excluded. Convincing evidence for associated small bowel obstruction or stercoral proctocolitis at this time. Marked distension of the stomach to the level of the duodenal C-loop which remains patent, likely related to mass effect from stool-filled and distended right hepatic flexure large bowel loops with concern for least partial/developing gastric outlet obstruction. He was started on Rocephin 1g, IV protonix. GI and Surgery were consulted.  He was admitted for UTI, susp GI bleed and susp fecal  impaction.         No past medical history on file.    No past surgical history on file.    Review of patient's allergies indicates:  No Known Allergies    No current facility-administered medications on file prior to encounter.     Current Outpatient Medications on File Prior to Encounter   Medication Sig    acetaminophen (TYLENOL) 325 MG tablet Take 650 mg by mouth every 6 (six) hours as needed for Temperature greater than (100.5).    aspirin (ECOTRIN) 81 MG EC tablet Take 81 mg by mouth once daily.    baclofen (LIORESAL) 10 MG tablet Take 10 mg by mouth 2 (two) times a day. Morning and Afternoon    baclofen (LIORESAL) 20 MG tablet Take 20 mg by mouth nightly.    diltiaZEM (CARDIZEM) 60 MG tablet Take 60 mg by mouth 2 (two) times a day.    docusate sodium (COLACE) 100 MG capsule Take 100 mg by mouth 2 (two) times daily.    ferrous sulfate (FEOSOL) 325 mg (65 mg iron) Tab tablet Take 325 mg by mouth once daily.    furosemide (LASIX) 20 MG tablet Take 20 mg by mouth every other day.    gabapentin (NEURONTIN) 600 MG tablet Take 600 mg by mouth 3 (three) times daily.    guaiFENesin (MUCINEX) 600 mg 12 hr tablet Take 600 mg by mouth 2 (two) times daily as needed for Cough or Congestion.    guaiFENesin 100 mg/5 ml (ROBITUSSIN) 100 mg/5 mL syrup Take 200 mg by mouth every 4 (four) hours as needed for Cough.    hydrocodone-acetaminophen (HYCET) solution 7.5-325 mg/15mL Take 15 mLs by mouth every 8 (eight) hours as needed for Pain.    lisinopriL (PRINIVIL,ZESTRIL) 5 MG tablet Take 5 mg by mouth once daily.    magnesium oxide (MAG-OX) 400 mg (241.3 mg magnesium) tablet Take 400 mg by mouth once daily. At 12 noon    melatonin (MELATIN) 5 mg Take 5 mg by mouth every evening.    multivitamin (THERAGRAN) per tablet Take 1 tablet by mouth once daily.    nicotine (NICODERM CQ) 14 mg/24 hr Place 1 patch onto the skin once daily.    ondansetron (ZOFRAN) 4 MG tablet Take 4 mg by mouth every 6 (six) hours as needed  for Nausea (or vomiting).    polyethylene glycol (GLYCOLAX) 17 gram/dose powder Take 17 g by mouth once daily. In 8 ounces of water    rOPINIRole (REQUIP) 4 MG tablet Take 4 mg by mouth every evening.    senna (SENOKOT) 8.6 mg tablet Take 2 tablets by mouth nightly as needed for Constipation.    sodium chloride 1 gram tablet Take 1 g by mouth once daily.    tamsulosin (FLOMAX) 0.4 mg Cap Take 0.4 mg by mouth every evening.    tiZANidine (ZANAFLEX) 6 mg capsule Take 6 mg by mouth every morning.    tiZANidine (ZANAFLEX) 6 mg capsule Take 12 mg by mouth every evening. 2 capsules    traZODone (DESYREL) 100 MG tablet Take 200 mg by mouth every evening.    [DISCONTINUED] bisacodyL (DULCOLAX) 10 mg Supp 10 mg.    [DISCONTINUED] clonazePAM (KLONOPIN) 2 MG Tab Take 2 mg by mouth.    [DISCONTINUED] diltiaZEM (CARDIZEM CD) 120 MG Cp24 Take 120 mg by mouth.    [DISCONTINUED] methenamine (HIPREX) 1 gram Tab Take 1 g by mouth.    [DISCONTINUED] potassium citrate (UROCIT-K) 10 mEq (1,080 mg) TbSR Take by mouth.    [DISCONTINUED] traZODone (DESYREL) 50 MG tablet Take 100 mg by mouth.     Family History    None       Tobacco Use    Smoking status: Not on file    Smokeless tobacco: Not on file   Substance and Sexual Activity    Alcohol use: Not on file    Drug use: Not on file    Sexual activity: Not on file     Review of Systems   Constitutional:  Positive for appetite change. Negative for fever.   Respiratory:  Negative for cough, chest tightness and shortness of breath.    Cardiovascular:  Negative for chest pain, palpitations and leg swelling.   Gastrointestinal:  Positive for abdominal pain, constipation and vomiting.   Genitourinary:  Negative for dysuria.   Musculoskeletal:  Negative for arthralgias and myalgias.   Neurological:  Positive for weakness. Negative for dizziness.   Objective:     Vital Signs (Most Recent):  Temp: 99.1 °F (37.3 °C) (12/29/22 1033)  Pulse: 104 (12/29/22 1033)  Resp: 20 (12/29/22  1033)  BP: 102/69 (12/29/22 1033)  SpO2: 96 % (12/29/22 1033) Vital Signs (24h Range):  Temp:  [99.1 °F (37.3 °C)] 99.1 °F (37.3 °C)  Pulse:  [104] 104  Resp:  [20] 20  SpO2:  [96 %] 96 %  BP: (102)/(69) 102/69     Weight: 59.9 kg (132 lb)  Body mass index is 35.68 kg/m².    Physical Exam  Constitutional:       Appearance: Normal appearance. He is obese.   HENT:      Head: Normocephalic and atraumatic.      Mouth/Throat:      Mouth: Mucous membranes are dry.      Comments: Tongue with brownish color, dry  Cardiovascular:      Rate and Rhythm: Normal rate and regular rhythm.      Pulses: Normal pulses.      Heart sounds: Normal heart sounds.   Pulmonary:      Effort: Pulmonary effort is normal.      Breath sounds: Normal breath sounds.   Abdominal:      General: Bowel sounds are normal. There is distension.      Tenderness: There is abdominal tenderness.      Comments: Extremely distended    Genitourinary:     Comments: Suprapubic catheter in place, no erythema, sign of infection  Musculoskeletal:      Right lower leg: No edema.      Left lower leg: No edema.   Neurological:      Mental Status: He is alert and oriented to person, place, and time.      Sensory: No sensory deficit.      Motor: Weakness present.      Comments: Weakness to legs, only able to move toes   Psychiatric:         Mood and Affect: Mood normal.         Behavior: Behavior normal.         Thought Content: Thought content normal.         Judgment: Judgment normal.           Significant Labs: All pertinent labs within the past 24 hours have been reviewed.  CBC:   Recent Labs   Lab 12/29/22  1106   WBC 15.54*   HGB 13.9*   HCT 44.3        CMP:   Recent Labs   Lab 12/29/22  1106      K 3.5   CL 86*   CO2 39*   *   BUN 60*   CREATININE 1.0   CALCIUM 9.5   PROT 7.9   ALBUMIN 3.8   BILITOT 0.5   ALKPHOS 82   AST 25   ALT 21   ANIONGAP 19*       Significant Imaging: I have reviewed all pertinent imaging results/findings within the past  24 hours.    Assessment/Plan:     * GI bleed  2 days of  Constant coffee-ground/blood-streaked emesis  Hgb 13.9   Denies blood per rectum  Significant fecal load  T+C drawn    PLAN:  IV maintenance fluids  Zofran prn  Protonix 40mg IV  2 large bore IVs  GI consult, f/u recs  NPO    Constipation  States last BM was 3 days ago  Distended abd, mildly tender  Denies diarrhea, blood in stool  Pos bowel sounds  CT: 1. Large colonic and rectal stool burden resulting suggesting constipation, with rectal impaction not excluded.  Convincing evidence for associated small bowel obstruction or stercoral proctocolitis at this time.  2. Marked distension of the stomach to the level of the duodenal C-loop which remains patent, likely related to mass effect from stool-filled and distended right hepatic flexure large bowel loops with concern for least partial/developing gastric outlet obstruction.  Correlation advised and further evaluation/follow-up as warranted.  Surgical consultation may be warranted.  3. Elevated left hemidiaphragm presumably related to mass effect from marked gastric distension, with resultant left lower lobe atelectasis volume loss and leftward shift of the heart and lower mediastinum.  Bibasilar dependent atelectasis with scattered air bronchograms, for which superimposed aspiration or pneumonia is not excluded.    PLAN:  Miralax, dulcolax  Soap suds enema  NG decompression  Surgical consult, f/u recs      UTI (urinary tract infection)  Permanent suprapubic catheter  Hx of UTIs  UA w/PRO,  Occult blood +2, Arnol +3, deann on micro     PLAN:  Continue 1g rocephin qd (from ED)  F/u cultures    HTN (hypertension)  Continue home  Lasix  20mg qd  Diltiazem 60mg bid    Holding losartan 5mg (normotensive)    Muscle spasms of both lower extremities  Continue home   Baclofen 20mg qhs  Gabapentin 600mg tid  robinirole 4mg qhs      VTE Risk Mitigation (From admission, onward)         Ordered     IP VTE HIGH RISK PATIENT   Once         12/29/22 1537     Place sequential compression device  Until discontinued         12/29/22 1537                   Robi Sotelo MD  Department of Hospital Medicine   [facility] - Emergency Dept

## 2022-12-29 NOTE — ED NOTES
Using clean technique  bag to suprapubic catheter change. 200 cc foul smelling yellow urine noted to previous  bag.

## 2022-12-29 NOTE — HPI
63 y/o male from [facility], pmhx: dwarfism, neurogenic bladder with suprapubic catheter, paraplegia (uses wheelchair), restless leg, muscle spasms, HTN, ileus, former etoh user, smoker,  uses home O2 (2lpm).    C/o abdominal bloating for the past 4 days along with coffee-ground and red-streaked emesis for two days (upwards of 10x). Denies bloody/dark stool, GERD, fever, chills, diarrhea,  chest pain, SOB. States last BM was 3 days ago.      In Emergency, 102/69, , 99.1F, SpO2  96% 3lpm. Labs were significant for WBC 15.54, Hgb 13.9 (2mo ago 11.5), Cl- 86, HCO3- 39, BUN 60, Cr wnl, anion gap 19, UA PRO +2, Occult blood +2, Leuks +3, many deann on micro. Trip,  lipase , INR, wnl. CXR showed evidence of distended abd. CT showed Large colonic and rectal stool burden resulting suggesting constipation, with rectal impaction not excluded. Convincing evidence for associated small bowel obstruction or stercoral proctocolitis at this time. Marked distension of the stomach to the level of the duodenal C-loop which remains patent, likely related to mass effect from stool-filled and distended right hepatic flexure large bowel loops with concern for least partial/developing gastric outlet obstruction. He was started on Rocephin 1g, IV protonix. GI and Surgery were consulted.  He was admitted for UTI, susp GI bleed and susp fecal impaction.

## 2022-12-29 NOTE — ASSESSMENT & PLAN NOTE
Permanent suprapubic catheter  Hx of UTIs  UA w/PRO,  Occult blood +2, Arnol +3, deann on micro     PLAN:  Continue 1g rocephin qd (from ED)  F/u cultures

## 2022-12-29 NOTE — ASSESSMENT & PLAN NOTE
States last BM was 3 days ago  Distended abd, mildly tender  Denies diarrhea, blood in stool  Pos bowel sounds  CT: 1. Large colonic and rectal stool burden resulting suggesting constipation, with rectal impaction not excluded.  Convincing evidence for associated small bowel obstruction or stercoral proctocolitis at this time.  2. Marked distension of the stomach to the level of the duodenal C-loop which remains patent, likely related to mass effect from stool-filled and distended right hepatic flexure large bowel loops with concern for least partial/developing gastric outlet obstruction.  Correlation advised and further evaluation/follow-up as warranted.  Surgical consultation may be warranted.  3. Elevated left hemidiaphragm presumably related to mass effect from marked gastric distension, with resultant left lower lobe atelectasis volume loss and leftward shift of the heart and lower mediastinum.  Bibasilar dependent atelectasis with scattered air bronchograms, for which superimposed aspiration or pneumonia is not excluded.    PLAN:  Miralax, dulcolax  Soap suds enema  NG decompression  Surgical consult, f/u recs

## 2022-12-29 NOTE — ED PROVIDER NOTES
Encounter Date: 12/29/2022       History     Chief Complaint   Patient presents with    Abdominal Pain     Pt arrives via EMS from [facility], with complaints of  n/v and abd swelling, EMS reports coffee ground  emesis      HPI  62-year-old male with a history of neurogenic bladder with suprapubic catheter, a quadriplegia, hypertension, ileus, presenting with abdominal bloating for the past 4 days along with coffee-ground emesis today  Patient denies fever, chills, diarrhea  Denies any history of such, states that he had a remote history of alcohol use disorder    Review of patient's allergies indicates:  No Known Allergies  No past medical history on file.  No past surgical history on file.  No family history on file.     Review of Systems   Constitutional:  Negative for fever.   HENT:  Negative for sore throat.    Respiratory:  Positive for shortness of breath.    Cardiovascular:  Negative for chest pain.   Gastrointestinal:  Positive for nausea and vomiting.   Genitourinary:  Negative for dysuria.   Musculoskeletal:  Negative for back pain.   Skin:  Negative for rash.   Neurological:  Negative for weakness.   Hematological:  Does not bruise/bleed easily.     Physical Exam     Initial Vitals [12/29/22 1033]   BP Pulse Resp Temp SpO2   102/69 104 20 99.1 °F (37.3 °C) 96 %      MAP       --         Physical Exam    Nursing note and vitals reviewed.  Constitutional:   EXAM  General: Awake, alert and oriented. No acute distress.     Head: normocephalic and atraumatic     Eyes: Conjunctivae are clear without exudates or hemorrhage. Sclera is non-icteric. EOM are intact. Eyelids are normal in appearance without swelling or lesions.     Ears: The external ear and ear canal are non-tender and without swelling. The canal is clear without discharge. Hearing intact.     Nose: Nares are patent bilaterally.  Nasal cannula in place.     Neck: The neck is supple. Trachea is midline. Full ROM.     Cardiac: Regular  rate.     Respiratory:  Tachypneic.  No audible wheezes.     Abdominal:  Very distended.  No fluid wave.  Suprapubic catheter.     Extremities: Upper and lower extremities are atraumatic in appearance without tenderness or deformity. No swelling or erythema. Full range of motion.     Skin: Appropriate color for ethnicity.     Neurological: The patient is awake, alert and oriented to person, place, and time with normal speech.     Psychiatric: Appropriate mood and affect.     In light of current/ongoing global covid-19 pandemic, all my encounters w pt were with full ppe including but not limited to gown, gloves, n95, eye protection OR from >6 ft away.           ED Course   Procedures  Labs Reviewed   CBC W/ AUTO DIFFERENTIAL - Abnormal; Notable for the following components:       Result Value    WBC 15.54 (*)     Hemoglobin 13.9 (*)     MCHC 31.4 (*)     Gran # (ANC) 13.4 (*)     Immature Grans (Abs) 0.05 (*)     Gran % 86.2 (*)     Lymph % 9.8 (*)     Mono % 3.4 (*)     All other components within normal limits   COMPREHENSIVE METABOLIC PANEL - Abnormal; Notable for the following components:    Chloride 86 (*)     CO2 39 (*)     Glucose 129 (*)     BUN 60 (*)     Anion Gap 19 (*)     All other components within normal limits   URINALYSIS, REFLEX TO URINE CULTURE - Abnormal; Notable for the following components:    Appearance, UA Cloudy (*)     Protein, UA 2+ (*)     Occult Blood UA 2+ (*)     Leukocytes, UA 3+ (*)     All other components within normal limits    Narrative:     Specimen Source->Urine   URINALYSIS MICROSCOPIC - Abnormal; Notable for the following components:    RBC, UA 74 (*)     WBC, UA >100 (*)     WBC Clumps, UA Many (*)     Bacteria Many (*)     All other components within normal limits    Narrative:     Specimen Source->Urine   CULTURE, URINE   PROTIME-INR   LIPASE   TROPONIN I   TROPONIN I   LACTIC ACID, PLASMA   TYPE & SCREEN     EKG Readings: (Independently Interpreted)   Sinus tachycardia, ST  depressions V5, V6, no ST elevations, normal intervals   ECG Results              EKG 12-lead (In process)  Result time 12/29/22 11:04:34      In process by Interface, Lab In [facility] (12/29/22 11:04:34)                   Narrative:    Test Reason : R11.10,    Vent. Rate : 115 BPM     Atrial Rate : 115 BPM     P-R Int : 182 ms          QRS Dur : 082 ms      QT Int : 320 ms       P-R-T Axes : 080 111 088 degrees     QTc Int : 442 ms    Sinus tachycardia  Biatrial enlargement  Right axis deviation  Cannot rule out Anterior infarct ,age undetermined  ST and T wave abnormality, consider inferior ischemia  Abnormal ECG  No previous ECGs available    Referred By: System System           Confirmed By:                                   Imaging Results              CT Abdomen Pelvis With Contrast (In process)                      X-Ray Chest AP Portable (Final result)  Result time 12/29/22 12:17:22      Final result by Amari Ojeda MD (12/29/22 12:17:22)                   Impression:      See above      Electronically signed by: Amari Ojeda MD  Date:    12/29/2022  Time:    12:17               Narrative:    EXAMINATION:  XR CHEST AP PORTABLE    CLINICAL HISTORY:  Epigastric pain    TECHNIQUE:  Single frontal view of the chest was performed.    COMPARISON:  None    FINDINGS:  Heart size normal.  Opacification identified at the left lung base with blunted costophrenic angle probably a combination of pleural effusion and atelectasis.  The right lung is clear.  Postoperative changes of spinal fusion identified.  Distended bowel loops partially visualized in the upper abdomen.                                       Medications   cefTRIAXone (ROCEPHIN) 1 g/50 mL D5W IVPB (1 g Intravenous New Bag 12/29/22 1436)   sodium chloride 0.9% bolus 500 mL 500 mL (500 mLs Intravenous New Bag 12/29/22 1436)   pantoprazole injection 80 mg (80 mg Intravenous Given 12/29/22 1206)   iohexoL (OMNIPAQUE 350) injection 75 mL (75 mLs Intravenous  Given 12/29/22 1330)     Medical Decision Making:   Clinical Tests:   Lab Tests: Ordered and Reviewed  Radiological Study: Ordered and Reviewed  Medical Tests: Ordered and Reviewed  ED Management:  Nontoxic-appearing patient stating that he is having nausea and vomiting and abdominal bloating.  On 3 L nasal cannula to maintain oxygen saturations, likely due to patient's abdominal distention and pleural effusion.  Labs are significant for leukocytosis, evidence of urinary tract infection, elevated BUN.  Normal troponin.  EKG is concerning for ST depressions in the lateral leads, unclear if this is related to his elevated rate.  Patient denies any chest pain.  X-ray is significant for atelectasis/pleural effusion which has been seen before in the past.  Concern for SBO, GI bleed.  Awaiting CT results.    NG tube ordered as patient is very distended.  Message left for Dr. Carter given patient's elevated BUN and concern for GI bleed.  Patient is clinically stable at this point.  Antibiotics ordered for his urinary tract infection.  Patient has been admitted to the Family Medicine service.                        Clinical Impression:   Final diagnoses:  [R11.10] Vomiting  [R10.13] Epigastric abdominal pain  [T83.511A, N39.0] Urinary tract infection associated with indwelling urethral catheter, initial encounter (Primary)        ED Disposition Condition    Observation Stable                Renea Mejia MD  12/29/22 5498

## 2022-12-29 NOTE — ASSESSMENT & PLAN NOTE
2 days of  Constant coffee-ground/blood-streaked emesis  Hgb 13.9   Denies blood per rectum  Significant fecal load  T+C drawn    PLAN:  IV maintenance fluids  Zofran prn  Protonix 40mg IV  2 large bore IVs  GI consult, f/u recs  NPO

## 2022-12-29 NOTE — SUBJECTIVE & OBJECTIVE
No past medical history on file.    No past surgical history on file.    Review of patient's allergies indicates:  No Known Allergies    No current facility-administered medications on file prior to encounter.     Current Outpatient Medications on File Prior to Encounter   Medication Sig    acetaminophen (TYLENOL) 325 MG tablet Take 650 mg by mouth every 6 (six) hours as needed for Temperature greater than (100.5).    aspirin (ECOTRIN) 81 MG EC tablet Take 81 mg by mouth once daily.    baclofen (LIORESAL) 10 MG tablet Take 10 mg by mouth 2 (two) times a day. Morning and Afternoon    baclofen (LIORESAL) 20 MG tablet Take 20 mg by mouth nightly.    diltiaZEM (CARDIZEM) 60 MG tablet Take 60 mg by mouth 2 (two) times a day.    docusate sodium (COLACE) 100 MG capsule Take 100 mg by mouth 2 (two) times daily.    ferrous sulfate (FEOSOL) 325 mg (65 mg iron) Tab tablet Take 325 mg by mouth once daily.    furosemide (LASIX) 20 MG tablet Take 20 mg by mouth every other day.    gabapentin (NEURONTIN) 600 MG tablet Take 600 mg by mouth 3 (three) times daily.    guaiFENesin (MUCINEX) 600 mg 12 hr tablet Take 600 mg by mouth 2 (two) times daily as needed for Cough or Congestion.    guaiFENesin 100 mg/5 ml (ROBITUSSIN) 100 mg/5 mL syrup Take 200 mg by mouth every 4 (four) hours as needed for Cough.    hydrocodone-acetaminophen (HYCET) solution 7.5-325 mg/15mL Take 15 mLs by mouth every 8 (eight) hours as needed for Pain.    lisinopriL (PRINIVIL,ZESTRIL) 5 MG tablet Take 5 mg by mouth once daily.    magnesium oxide (MAG-OX) 400 mg (241.3 mg magnesium) tablet Take 400 mg by mouth once daily. At 12 noon    melatonin (MELATIN) 5 mg Take 5 mg by mouth every evening.    multivitamin (THERAGRAN) per tablet Take 1 tablet by mouth once daily.    nicotine (NICODERM CQ) 14 mg/24 hr Place 1 patch onto the skin once daily.    ondansetron (ZOFRAN) 4 MG tablet Take 4 mg by mouth every 6 (six) hours as needed for Nausea (or vomiting).     polyethylene glycol (GLYCOLAX) 17 gram/dose powder Take 17 g by mouth once daily. In 8 ounces of water    rOPINIRole (REQUIP) 4 MG tablet Take 4 mg by mouth every evening.    senna (SENOKOT) 8.6 mg tablet Take 2 tablets by mouth nightly as needed for Constipation.    sodium chloride 1 gram tablet Take 1 g by mouth once daily.    tamsulosin (FLOMAX) 0.4 mg Cap Take 0.4 mg by mouth every evening.    tiZANidine (ZANAFLEX) 6 mg capsule Take 6 mg by mouth every morning.    tiZANidine (ZANAFLEX) 6 mg capsule Take 12 mg by mouth every evening. 2 capsules    traZODone (DESYREL) 100 MG tablet Take 200 mg by mouth every evening.    [DISCONTINUED] bisacodyL (DULCOLAX) 10 mg Supp 10 mg.    [DISCONTINUED] clonazePAM (KLONOPIN) 2 MG Tab Take 2 mg by mouth.    [DISCONTINUED] diltiaZEM (CARDIZEM CD) 120 MG Cp24 Take 120 mg by mouth.    [DISCONTINUED] methenamine (HIPREX) 1 gram Tab Take 1 g by mouth.    [DISCONTINUED] potassium citrate (UROCIT-K) 10 mEq (1,080 mg) TbSR Take by mouth.    [DISCONTINUED] traZODone (DESYREL) 50 MG tablet Take 100 mg by mouth.     Family History    None       Tobacco Use    Smoking status: Not on file    Smokeless tobacco: Not on file   Substance and Sexual Activity    Alcohol use: Not on file    Drug use: Not on file    Sexual activity: Not on file     Review of Systems   Constitutional:  Positive for appetite change. Negative for fever.   Respiratory:  Negative for cough, chest tightness and shortness of breath.    Cardiovascular:  Negative for chest pain, palpitations and leg swelling.   Gastrointestinal:  Positive for abdominal pain, constipation and vomiting.   Genitourinary:  Negative for dysuria.   Musculoskeletal:  Negative for arthralgias and myalgias.   Neurological:  Positive for weakness. Negative for dizziness.   Objective:     Vital Signs (Most Recent):  Temp: 99.1 °F (37.3 °C) (12/29/22 1033)  Pulse: 104 (12/29/22 1033)  Resp: 20 (12/29/22 1033)  BP: 102/69 (12/29/22 1033)  SpO2: 96 %  (12/29/22 1033) Vital Signs (24h Range):  Temp:  [99.1 °F (37.3 °C)] 99.1 °F (37.3 °C)  Pulse:  [104] 104  Resp:  [20] 20  SpO2:  [96 %] 96 %  BP: (102)/(69) 102/69     Weight: 59.9 kg (132 lb)  Body mass index is 35.68 kg/m².    Physical Exam  Constitutional:       Appearance: Normal appearance. He is obese.   HENT:      Head: Normocephalic and atraumatic.      Mouth/Throat:      Mouth: Mucous membranes are dry.      Comments: Tongue with brownish color, dry  Cardiovascular:      Rate and Rhythm: Normal rate and regular rhythm.      Pulses: Normal pulses.      Heart sounds: Normal heart sounds.   Pulmonary:      Effort: Pulmonary effort is normal.      Breath sounds: Normal breath sounds.   Abdominal:      General: Bowel sounds are normal. There is distension.      Tenderness: There is abdominal tenderness.      Comments: Extremely distended    Genitourinary:     Comments: Suprapubic catheter in place, no erythema, sign of infection  Musculoskeletal:      Right lower leg: No edema.      Left lower leg: No edema.   Neurological:      Mental Status: He is alert and oriented to person, place, and time.      Sensory: No sensory deficit.      Motor: Weakness present.      Comments: Weakness to legs, only able to move toes   Psychiatric:         Mood and Affect: Mood normal.         Behavior: Behavior normal.         Thought Content: Thought content normal.         Judgment: Judgment normal.           Significant Labs: All pertinent labs within the past 24 hours have been reviewed.  CBC:   Recent Labs   Lab 12/29/22  1106   WBC 15.54*   HGB 13.9*   HCT 44.3        CMP:   Recent Labs   Lab 12/29/22  1106      K 3.5   CL 86*   CO2 39*   *   BUN 60*   CREATININE 1.0   CALCIUM 9.5   PROT 7.9   ALBUMIN 3.8   BILITOT 0.5   ALKPHOS 82   AST 25   ALT 21   ANIONGAP 19*       Significant Imaging: I have reviewed all pertinent imaging results/findings within the past 24 hours.

## 2022-12-29 NOTE — ED NOTES
NG tube placed to right nostril. Gastric contents check with pH strips. pH 2. Pt tolerated well. Awaiting Xray to confirm placement.

## 2022-12-30 PROBLEM — R11.10 VOMITING: Status: ACTIVE | Noted: 2022-12-30

## 2022-12-30 LAB
ALBUMIN SERPL BCP-MCNC: 3.3 G/DL (ref 3.5–5.2)
ALP SERPL-CCNC: 79 U/L (ref 55–135)
ALT SERPL W/O P-5'-P-CCNC: 19 U/L (ref 10–44)
ANION GAP SERPL CALC-SCNC: 20 MMOL/L (ref 8–16)
AST SERPL-CCNC: 30 U/L (ref 10–40)
BASOPHILS # BLD AUTO: 0.03 K/UL (ref 0–0.2)
BASOPHILS NFR BLD: 0.3 % (ref 0–1.9)
BILIRUB SERPL-MCNC: 0.3 MG/DL (ref 0.1–1)
BUN SERPL-MCNC: 66 MG/DL (ref 6–20)
CALCIUM SERPL-MCNC: 8.6 MG/DL (ref 8.7–10.5)
CHLORIDE SERPL-SCNC: 93 MMOL/L (ref 95–110)
CO2 SERPL-SCNC: 29 MMOL/L (ref 23–29)
CREAT SERPL-MCNC: 1 MG/DL (ref 0.5–1.4)
DELSYS: ABNORMAL
DIFFERENTIAL METHOD: ABNORMAL
EOSINOPHIL # BLD AUTO: 0 K/UL (ref 0–0.5)
EOSINOPHIL NFR BLD: 0 % (ref 0–8)
ERYTHROCYTE [DISTWIDTH] IN BLOOD BY AUTOMATED COUNT: 14.1 % (ref 11.5–14.5)
EST. GFR  (NO RACE VARIABLE): >60 ML/MIN/1.73 M^2
ESTIMATED AVG GLUCOSE: 103 MG/DL (ref 68–131)
GLUCOSE SERPL-MCNC: 71 MG/DL (ref 70–110)
HBA1C MFR BLD: 5.2 % (ref 4–5.6)
HCO3 UR-SCNC: 48.5 MMOL/L (ref 24–28)
HCT VFR BLD AUTO: 39.4 % (ref 40–54)
HCT VFR BLD CALC: 45 %PCV (ref 36–54)
HGB BLD-MCNC: 12.3 G/DL (ref 14–18)
HGB BLD-MCNC: 15 G/DL
IMM GRANULOCYTES # BLD AUTO: 0.04 K/UL (ref 0–0.04)
IMM GRANULOCYTES NFR BLD AUTO: 0.4 % (ref 0–0.5)
LYMPHOCYTES # BLD AUTO: 2 K/UL (ref 1–4.8)
LYMPHOCYTES NFR BLD: 17.8 % (ref 18–48)
MAGNESIUM SERPL-MCNC: 3 MG/DL (ref 1.6–2.6)
MCH RBC QN AUTO: 28.3 PG (ref 27–31)
MCHC RBC AUTO-ENTMCNC: 31.2 G/DL (ref 32–36)
MCV RBC AUTO: 91 FL (ref 82–98)
MONOCYTES # BLD AUTO: 0.4 K/UL (ref 0.3–1)
MONOCYTES NFR BLD: 3.9 % (ref 4–15)
NEUTROPHILS # BLD AUTO: 8.7 K/UL (ref 1.8–7.7)
NEUTROPHILS NFR BLD: 77.6 % (ref 38–73)
NRBC BLD-RTO: 0 /100 WBC
PCO2 BLDA: 81.5 MMHG (ref 35–45)
PH SMN: 7.38 [PH] (ref 7.35–7.45)
PHOSPHATE SERPL-MCNC: 5.2 MG/DL (ref 2.7–4.5)
PLATELET # BLD AUTO: 275 K/UL (ref 150–450)
PMV BLD AUTO: 9.2 FL (ref 9.2–12.9)
PO2 BLDA: 26 MMHG (ref 40–60)
POC BE: 23 MMOL/L
POC SATURATED O2: 43 % (ref 95–100)
POC TCO2: >50 MMOL/L (ref 24–29)
POCT GLUCOSE: 90 MG/DL (ref 70–110)
POTASSIUM BLD-SCNC: 3.4 MMOL/L (ref 3.5–5.1)
POTASSIUM SERPL-SCNC: 4 MMOL/L (ref 3.5–5.1)
PROT SERPL-MCNC: 6.8 G/DL (ref 6–8.4)
RBC # BLD AUTO: 4.35 M/UL (ref 4.6–6.2)
SAMPLE: ABNORMAL
SODIUM BLD-SCNC: 140 MMOL/L (ref 136–145)
SODIUM SERPL-SCNC: 142 MMOL/L (ref 136–145)
T4 FREE SERPL-MCNC: 1.2 NG/DL (ref 0.71–1.51)
TSH SERPL DL<=0.005 MIU/L-ACNC: 0.06 UIU/ML (ref 0.4–4)
WBC # BLD AUTO: 11.23 K/UL (ref 3.9–12.7)

## 2022-12-30 PROCEDURE — 25000003 PHARM REV CODE 250: Performed by: STUDENT IN AN ORGANIZED HEALTH CARE EDUCATION/TRAINING PROGRAM

## 2022-12-30 PROCEDURE — 99222 PR INITIAL HOSPITAL CARE,LEVL II: ICD-10-PCS | Mod: ,,, | Performed by: STUDENT IN AN ORGANIZED HEALTH CARE EDUCATION/TRAINING PROGRAM

## 2022-12-30 PROCEDURE — 83036 HEMOGLOBIN GLYCOSYLATED A1C: CPT

## 2022-12-30 PROCEDURE — 84443 ASSAY THYROID STIM HORMONE: CPT

## 2022-12-30 PROCEDURE — 63600175 PHARM REV CODE 636 W HCPCS

## 2022-12-30 PROCEDURE — 99222 1ST HOSP IP/OBS MODERATE 55: CPT | Mod: ,,, | Performed by: STUDENT IN AN ORGANIZED HEALTH CARE EDUCATION/TRAINING PROGRAM

## 2022-12-30 PROCEDURE — G0378 HOSPITAL OBSERVATION PER HR: HCPCS

## 2022-12-30 PROCEDURE — 83735 ASSAY OF MAGNESIUM: CPT

## 2022-12-30 PROCEDURE — 84439 ASSAY OF FREE THYROXINE: CPT

## 2022-12-30 PROCEDURE — 11000001 HC ACUTE MED/SURG PRIVATE ROOM

## 2022-12-30 PROCEDURE — 85025 COMPLETE CBC W/AUTO DIFF WBC: CPT

## 2022-12-30 PROCEDURE — 99232 SBSQ HOSP IP/OBS MODERATE 35: CPT | Mod: ,,, | Performed by: INTERNAL MEDICINE

## 2022-12-30 PROCEDURE — 80053 COMPREHEN METABOLIC PANEL: CPT

## 2022-12-30 PROCEDURE — 25000003 PHARM REV CODE 250

## 2022-12-30 PROCEDURE — C9113 INJ PANTOPRAZOLE SODIUM, VIA: HCPCS

## 2022-12-30 PROCEDURE — 84100 ASSAY OF PHOSPHORUS: CPT

## 2022-12-30 PROCEDURE — 99232 PR SUBSEQUENT HOSPITAL CARE,LEVL II: ICD-10-PCS | Mod: ,,, | Performed by: INTERNAL MEDICINE

## 2022-12-30 PROCEDURE — 94761 N-INVAS EAR/PLS OXIMETRY MLT: CPT

## 2022-12-30 PROCEDURE — 36415 COLL VENOUS BLD VENIPUNCTURE: CPT

## 2022-12-30 RX ORDER — LACTULOSE 10 G/15ML
15 SOLUTION ORAL 3 TIMES DAILY
Status: DISCONTINUED | OUTPATIENT
Start: 2022-12-30 | End: 2023-01-02

## 2022-12-30 RX ORDER — SENNOSIDES 8.6 MG/1
8.6 TABLET ORAL DAILY
Status: DISCONTINUED | OUTPATIENT
Start: 2022-12-30 | End: 2023-01-02

## 2022-12-30 RX ADMIN — LACTULOSE 15 G: 10 SOLUTION ORAL at 09:12

## 2022-12-30 RX ADMIN — Medication 6 MG: at 09:12

## 2022-12-30 RX ADMIN — DOCUSATE SODIUM 100 MG: 100 CAPSULE, LIQUID FILLED ORAL at 09:12

## 2022-12-30 RX ADMIN — DILTIAZEM HYDROCHLORIDE 60 MG: 30 TABLET, FILM COATED ORAL at 09:12

## 2022-12-30 RX ADMIN — MAGNESIUM OXIDE TAB 400 MG (241.3 MG ELEMENTAL MG) 400 MG: 400 (241.3 MG) TAB at 09:12

## 2022-12-30 RX ADMIN — CEFTRIAXONE 1 G: 1 INJECTION, SOLUTION INTRAVENOUS at 01:12

## 2022-12-30 RX ADMIN — Medication 1 TABLET: at 09:12

## 2022-12-30 RX ADMIN — ROPINIROLE HYDROCHLORIDE 4 MG: 1 TABLET, FILM COATED ORAL at 09:12

## 2022-12-30 RX ADMIN — SENNOSIDES 8.6 MG: 8.6 TABLET, FILM COATED ORAL at 11:12

## 2022-12-30 RX ADMIN — HYPROMELLOSE 2910 1 DROP: 5 SOLUTION/ DROPS OPHTHALMIC at 10:12

## 2022-12-30 RX ADMIN — BACLOFEN 20 MG: 10 TABLET ORAL at 09:12

## 2022-12-30 RX ADMIN — FUROSEMIDE 20 MG: 20 TABLET ORAL at 10:12

## 2022-12-30 RX ADMIN — POLYETHYLENE GLYCOL 3350 17 G: 17 POWDER, FOR SOLUTION ORAL at 09:12

## 2022-12-30 RX ADMIN — PANTOPRAZOLE SODIUM 40 MG: 40 INJECTION, POWDER, FOR SOLUTION INTRAVENOUS at 09:12

## 2022-12-30 RX ADMIN — LACTULOSE 15 G: 10 SOLUTION ORAL at 04:12

## 2022-12-30 RX ADMIN — GABAPENTIN 600 MG: 300 CAPSULE ORAL at 09:12

## 2022-12-30 RX ADMIN — TIZANIDINE 6 MG: 2 TABLET ORAL at 05:12

## 2022-12-30 RX ADMIN — DILTIAZEM HYDROCHLORIDE 60 MG: 30 TABLET, FILM COATED ORAL at 10:12

## 2022-12-30 RX ADMIN — SODIUM CHLORIDE, SODIUM LACTATE, POTASSIUM CHLORIDE, AND CALCIUM CHLORIDE: .6; .31; .03; .02 INJECTION, SOLUTION INTRAVENOUS at 01:12

## 2022-12-30 RX ADMIN — PANTOPRAZOLE SODIUM 40 MG: 40 INJECTION, POWDER, FOR SOLUTION INTRAVENOUS at 10:12

## 2022-12-30 RX ADMIN — GABAPENTIN 600 MG: 300 CAPSULE ORAL at 04:12

## 2022-12-30 RX ADMIN — TAMSULOSIN HYDROCHLORIDE 0.4 MG: 0.4 CAPSULE ORAL at 09:12

## 2022-12-30 NOTE — ASSESSMENT & PLAN NOTE
61 yo male with history of constipation and large stool burden, concern for obstruction. Scan reviewed with staff, no appreciable obstruction noted.     Agree with GI recommendations, needs fiber, enemas, heavy duty bowel regimen  No surgical intervention at this time  Will continue to follow  Rest of care per primary

## 2022-12-30 NOTE — PROGRESS NOTES
[facility] Medicine  Progress Note    Patient Name: Shaw Heredia  MRN: [id number]  Patient Class: OP- Observation   Admission Date: 12/29/2022  Length of Stay: 0 days  Attending Physician: Marychuy Brand MD  Primary Care Provider: Primary Doctor No        Subjective:     Principal Problem:GI bleed        HPI:  61 y/o male from [facility], pmhx: dwarfism, neurogenic bladder with suprapubic catheter, quadriplegia (uses wheelchair), restless leg, muscle spasms, HTN, ileus, former etoh user, smoker,  uses home O2 (2lpm).    C/o abdominal bloating for the past 4 days along with coffee-ground and red-streaked emesis for two days (upwards of 10x). Denies bloody/dark stool, GERD, fever, chills, diarrhea,  chest pain, SOB. States last BM was 3 days ago.      In Emergency, 102/69, , 99.1F, SpO2  96% 3lpm. Labs were significant for WBC 15.54, Hgb 13.9 (2mo ago 11.5), Cl- 86, HCO3- 39, BUN 60, Cr wnl, anion gap 19, UA PRO +2, Occult blood +2, Leuks +3, many deann on micro. Trip,  lipase , INR, wnl. CXR showed evidence of distended abd. CT showed Large colonic and rectal stool burden resulting suggesting constipation, with rectal impaction not excluded. Convincing evidence for associated small bowel obstruction or stercoral proctocolitis at this time. Marked distension of the stomach to the level of the duodenal C-loop which remains patent, likely related to mass effect from stool-filled and distended right hepatic flexure large bowel loops with concern for least partial/developing gastric outlet obstruction. He was started on Rocephin 1g, IV protonix. GI and Surgery were consulted.  He was admitted for UTI, susp GI bleed and susp fecal impaction.         Overview/Hospital Course:  No notes on file    Interval History:   VSSAF RA. NG tube in place, scant black liquid in tube.  Soft bp overnight, responded to fluid. Patient feels well. Had large BM before enema. Will continue with enemas, miralax,  softeners.  No need for OR, according to Dr. Quintero. Still NPO, possible EGD if other symptoms are controlled.  Suprapubic catheter draining yellow urine    Review of Systems   Constitutional:  Negative for fever.   Respiratory:  Negative for cough and shortness of breath.    Cardiovascular:  Negative for chest pain, palpitations and leg swelling.   Gastrointestinal:  Positive for abdominal distention and abdominal pain.   Genitourinary:  Negative for dysuria.   Musculoskeletal:  Negative for arthralgias and myalgias.   Neurological:  Negative for dizziness and weakness.   Objective:     Vital Signs (Most Recent):  Temp: 97 °F (36.1 °C) (12/30/22 0803)  Pulse: 61 (12/30/22 0803)  Resp: 20 (12/30/22 0803)  BP: (!) 91/51 (12/30/22 0803)  SpO2: 96 % (12/30/22 0824)   Vital Signs (24h Range):  Temp:  [97 °F (36.1 °C)-99.1 °F (37.3 °C)] 97 °F (36.1 °C)  Pulse:  [] 61  Resp:  [16-24] 20  SpO2:  [83 %-99 %] 96 %  BP: ()/(46-69) 91/51     Weight: 51.7 kg (113 lb 15.7 oz)  Body mass index is 30.81 kg/m².    Intake/Output Summary (Last 24 hours) at 12/30/2022 0837  Last data filed at 12/30/2022 0642  Gross per 24 hour   Intake 240 ml   Output 2550 ml   Net -2310 ml      Physical Exam  Constitutional:       Appearance: Normal appearance.   HENT:      Head: Normocephalic and atraumatic.      Mouth/Throat:      Mouth: Mucous membranes are dry.   Cardiovascular:      Rate and Rhythm: Normal rate and regular rhythm.      Pulses: Normal pulses.      Heart sounds: Normal heart sounds.   Pulmonary:      Effort: Pulmonary effort is normal.      Breath sounds: Normal breath sounds.   Abdominal:      General: There is distension.      Tenderness: There is abdominal tenderness.      Comments: Distention improved over yesterday  Stool burden palpable on exam   Musculoskeletal:      Right lower leg: No edema.      Left lower leg: No edema.   Neurological:      Mental Status: He is alert and oriented to person, place, and  time.   Psychiatric:         Mood and Affect: Mood normal.         Behavior: Behavior normal.         Thought Content: Thought content normal.         Judgment: Judgment normal.       Significant Labs: All pertinent labs within the past 24 hours have been reviewed.  CBC:   Recent Labs   Lab 12/29/22  1106 12/29/22  1629 12/30/22  0510   WBC 15.54*  --  11.23   HGB 13.9*  --  12.3*   HCT 44.3 45 39.4*     --  275     CMP:   Recent Labs   Lab 12/29/22  1106 12/30/22  0510    142   K 3.5 4.0   CL 86* 93*   CO2 39* 29   * 71   BUN 60* 66*   CREATININE 1.0 1.0   CALCIUM 9.5 8.6*   PROT 7.9 6.8   ALBUMIN 3.8 3.3*   BILITOT 0.5 0.3   ALKPHOS 82 79   AST 25 30   ALT 21 19   ANIONGAP 19* 20*       Significant Imaging: I have reviewed all pertinent imaging results/findings within the past 24 hours.      Assessment/Plan:      * GI bleed  2 days of  Constant coffee-ground/blood-streaked emesis  Hgb 13.9   Denies blood per rectum  Significant fecal load  T+C drawn    PLAN:  IV maintenance fluids  Zofran prn  Protonix 40mg IV  2 large bore IVs  GI consult, f/u recs  NPO    Constipation  States last BM was 3 days ago  Distended abd, mildly tender  Denies diarrhea, blood in stool  Pos bowel sounds  CT: 1. Large colonic and rectal stool burden resulting suggesting constipation, with rectal impaction not excluded.  Convincing evidence for associated small bowel obstruction or stercoral proctocolitis at this time.  2. Marked distension of the stomach to the level of the duodenal C-loop which remains patent, likely related to mass effect from stool-filled and distended right hepatic flexure large bowel loops with concern for least partial/developing gastric outlet obstruction.  Correlation advised and further evaluation/follow-up as warranted.  Surgical consultation may be warranted.  3. Elevated left hemidiaphragm presumably related to mass effect from marked gastric distension, with resultant left lower lobe  atelectasis volume loss and leftward shift of the heart and lower mediastinum.  Bibasilar dependent atelectasis with scattered air bronchograms, for which superimposed aspiration or pneumonia is not excluded.  Had BM before enema (enema not performed)  Surgery not recommending procedure     PLAN:  Miralax, dulcolax  Soap suds enema  NG decompression  Surgical consult, f/u recs      UTI (urinary tract infection)  Permanent suprapubic catheter  Hx of UTIs  UA w/PRO,  Occult blood +2, Arnol +3, deann on micro     PLAN:  Continue 1g rocephin qd (from ED)  F/u cultures    Partial gastric outlet obstruction        HTN (hypertension)  Continue home  Lasix  20mg qd  Diltiazem 60mg bid    Holding losartan 5mg (normotensive)    Muscle spasms of both lower extremities  Continue home   Baclofen 20mg qhs  Gabapentin 600mg tid  ropinirole 4mg qhs      VTE Risk Mitigation (From admission, onward)         Ordered     IP VTE HIGH RISK PATIENT  Once         12/29/22 1537     Place sequential compression device  Until discontinued         12/29/22 1537                Discharge Planning   OLIVER:      Code Status: Full Code   Is the patient medically ready for discharge?:     Reason for patient still in hospital (select all that apply): Patient trending condition                     Robi Sotelo MD  Department of Hospital Medicine   [facility] Surg

## 2022-12-30 NOTE — SUBJECTIVE & OBJECTIVE
Interval History:   VSSAF RA. NG tube in place, scant black liquid in tube.  Soft bp overnight, responded to fluid. Patient feels well. Had large BM before enema. Will continue with enemas, miralax, softeners.  No need for OR, according to Dr. Quintero. Still NPO, possible EGD if other symptoms are controlled.  Suprapubic catheter draining yellow urine    Review of Systems   Constitutional:  Negative for fever.   Respiratory:  Negative for cough and shortness of breath.    Cardiovascular:  Negative for chest pain, palpitations and leg swelling.   Gastrointestinal:  Positive for abdominal distention and abdominal pain.   Genitourinary:  Negative for dysuria.   Musculoskeletal:  Negative for arthralgias and myalgias.   Neurological:  Negative for dizziness and weakness.   Objective:     Vital Signs (Most Recent):  Temp: 97 °F (36.1 °C) (12/30/22 0803)  Pulse: 61 (12/30/22 0803)  Resp: 20 (12/30/22 0803)  BP: (!) 91/51 (12/30/22 0803)  SpO2: 96 % (12/30/22 0824)   Vital Signs (24h Range):  Temp:  [97 °F (36.1 °C)-99.1 °F (37.3 °C)] 97 °F (36.1 °C)  Pulse:  [] 61  Resp:  [16-24] 20  SpO2:  [83 %-99 %] 96 %  BP: ()/(46-69) 91/51     Weight: 51.7 kg (113 lb 15.7 oz)  Body mass index is 30.81 kg/m².    Intake/Output Summary (Last 24 hours) at 12/30/2022 0837  Last data filed at 12/30/2022 0642  Gross per 24 hour   Intake 240 ml   Output 2550 ml   Net -2310 ml      Physical Exam  Constitutional:       Appearance: Normal appearance.   HENT:      Head: Normocephalic and atraumatic.      Mouth/Throat:      Mouth: Mucous membranes are dry.   Cardiovascular:      Rate and Rhythm: Normal rate and regular rhythm.      Pulses: Normal pulses.      Heart sounds: Normal heart sounds.   Pulmonary:      Effort: Pulmonary effort is normal.      Breath sounds: Normal breath sounds.   Abdominal:      General: There is distension.      Tenderness: There is abdominal tenderness.      Comments: Distention improved over  yesterday  Stool burden palpable on exam   Musculoskeletal:      Right lower leg: No edema.      Left lower leg: No edema.   Neurological:      Mental Status: He is alert and oriented to person, place, and time.   Psychiatric:         Mood and Affect: Mood normal.         Behavior: Behavior normal.         Thought Content: Thought content normal.         Judgment: Judgment normal.       Significant Labs: All pertinent labs within the past 24 hours have been reviewed.  CBC:   Recent Labs   Lab 12/29/22  1106 12/29/22  1629 12/30/22  0510   WBC 15.54*  --  11.23   HGB 13.9*  --  12.3*   HCT 44.3 45 39.4*     --  275     CMP:   Recent Labs   Lab 12/29/22  1106 12/30/22  0510    142   K 3.5 4.0   CL 86* 93*   CO2 39* 29   * 71   BUN 60* 66*   CREATININE 1.0 1.0   CALCIUM 9.5 8.6*   PROT 7.9 6.8   ALBUMIN 3.8 3.3*   BILITOT 0.5 0.3   ALKPHOS 82 79   AST 25 30   ALT 21 19   ANIONGAP 19* 20*       Significant Imaging: I have reviewed all pertinent imaging results/findings within the past 24 hours.

## 2022-12-30 NOTE — CONSULTS
[facility] S[facility]  Gastroenterology  Consult Note    Patient Name: Shaw Heredia  MRN: [id number]  Admission Date: 12/29/2022  Hospital Length of Stay: 0 days  Code Status: Full Code   Attending Provider: Marychuy Brand MD   Consulting Provider: Pascual Carter MD  Primary Care Physician: Primary Doctor No  Principal Problem:GI bleed    Inpatient consult to Gastroenterology  Consult performed by: Pascual Carter MD  Consult ordered by: Renea Mejia MD        Subjective:     HPI:  This is 61 y/o male hx dwarfism, resident of [facility] here for abd distention, and vomiting with coffee grounds.  GI consulted for gi bleed  Pt states past 3 or so days progressive , worsening abd distention, upper abd , worse than before, has had a couple similar episodes in past but not this bad, has had constipation in past, developed vomiting today that turned dark black with some red streaks. Last BM 3 days prior.   He is not sure if he has had prior endoscopy    Ct showing marked constipation with fecal load, as well as colonic interposition creating what seems to be mass effect on the duodenal C loop causing partial gastric outlet / upper GI obstruction.        No past medical history on file.    No past surgical history on file.    Review of patient's allergies indicates:  No Known Allergies  Family History    None       Tobacco Use    Smoking status: Not on file    Smokeless tobacco: Not on file   Substance and Sexual Activity    Alcohol use: Not on file    Drug use: Not on file    Sexual activity: Not on file     Review of Systems   Constitutional:  Positive for activity change, appetite change and fatigue. Negative for chills and fever.   HENT:  Negative for facial swelling, mouth sores and trouble swallowing.    Eyes:  Negative for pain and redness.   Respiratory:  Negative for cough and shortness of breath.    Cardiovascular:  Negative for chest pain and leg swelling.   Gastrointestinal:  Positive for abdominal  distention, nausea and vomiting.   Genitourinary:  Negative for dysuria and hematuria.   Musculoskeletal:  Negative for gait problem and neck stiffness.   Skin:  Negative for rash and wound.   Neurological:  Positive for weakness. Negative for seizures and headaches.   Psychiatric/Behavioral:  Negative for confusion and hallucinations.    Objective:     Vital Signs (Most Recent):  Temp: 98.5 °F (36.9 °C) (12/29/22 1937)  Pulse: 71 (12/29/22 2008)  Resp: 18 (12/29/22 2008)  BP: (!) 90/52 (12/29/22 2000)  SpO2: 96 % (12/29/22 2008)   Vital Signs (24h Range):  Temp:  [98.1 °F (36.7 °C)-99.1 °F (37.3 °C)] 98.5 °F (36.9 °C)  Pulse:  [] 71  Resp:  [16-24] 18  SpO2:  [94 %-99 %] 96 %  BP: ()/(46-69) 90/52     Weight: 59.9 kg (132 lb) (12/29/22 1034)  Body mass index is 35.68 kg/m².    No intake or output data in the 24 hours ending 12/29/22 2217    Lines/Drains/Airways       Drain  Duration                  Urethral Catheter 08/15/18 1445 Double-lumen 16 Fr. 1597 days         NG/OG Tube 12/29/22 1637 14 Fr. Right nostril <1 day              Peripheral Intravenous Line  Duration                  Peripheral IV - Single Lumen 12/29/22 1034 20 G Left;Posterior Hand <1 day         Peripheral IV - Single Lumen 12/29/22 1106 18 G Posterior;Right Hand <1 day                    Physical Exam  Vitals reviewed.   Constitutional:       General: He is not in acute distress.     Appearance: He is well-developed.   HENT:      Head: Normocephalic and atraumatic.   Eyes:      General: No scleral icterus.     Conjunctiva/sclera: Conjunctivae normal.   Neck:      Thyroid: No thyromegaly.   Cardiovascular:      Rate and Rhythm: Normal rate and regular rhythm.   Pulmonary:      Effort: Pulmonary effort is normal. No respiratory distress.      Breath sounds: Normal breath sounds.   Abdominal:      General: There is distension (signifcant typmany upper abd ; nonrigid).      Palpations: Abdomen is soft.   Genitourinary:     Comments:  Suprapubic catheter  Musculoskeletal:         General: No tenderness. Normal range of motion.      Cervical back: Neck supple.   Lymphadenopathy:      Cervical: No cervical adenopathy.   Skin:     General: Skin is warm and dry.      Findings: No rash.   Neurological:      Mental Status: He is alert and oriented to person, place, and time.   Psychiatric:         Mood and Affect: Mood normal.         Behavior: Behavior normal.       Significant Labs:  Blood Culture: No results for input(s): LABBLOO in the last 48 hours.  CBC:   Recent Labs   Lab 12/29/22  1106   WBC 15.54*   HGB 13.9*   HCT 44.3        BMP:   Recent Labs   Lab 12/29/22  1106   *      K 3.5   CL 86*   CO2 39*   BUN 60*   CREATININE 1.0   CALCIUM 9.5     CMP:   Recent Labs   Lab 12/29/22  1106   *   CALCIUM 9.5   ALBUMIN 3.8   PROT 7.9      K 3.5   CO2 39*   CL 86*   BUN 60*   CREATININE 1.0   ALKPHOS 82   ALT 21   AST 25   BILITOT 0.5     Coagulation:   Recent Labs   Lab 12/29/22  1106   INR 1.0     CRP: No results for input(s): CRP in the last 48 hours.  ESR: No results for input(s): SEDRATE in the last 48 hours.  H.Pylori Ab IgG: No results for input(s): HPYLORIIGG in the last 48 hours.  Lipase:   Recent Labs   Lab 12/29/22  1106   LIPASE 32     Liver Function Test:   Recent Labs   Lab 12/29/22  1106   ALT 21   AST 25   ALKPHOS 82   BILITOT 0.5   PROT 7.9   ALBUMIN 3.8     Peritoneal Fluid Cultures: No results for input(s): AEROBICCULTU, LABGRAM in the last 48 hours.    Significant Imaging:  Imaging results within the past 24 hours have been reviewed.    Assessment/Plan:     * GI bleed  HDS    PPI IV BID  NPO  Will consider endoscopy once obstuctive symptoms controlled, earliest tomorrow possibly      Partial gastric outlet obstruction  Suspected from mass effect by colonic interposition and constipation    Needs AGGRESSIVE bowel regimen, at least 2 -3 enemas /  Day  Miralax, lactulose, etc  NG for  decompression  Serial KUB  Appreciate surgical consult    Down the road, if symptoms recurrent, he may benefit from surgical correctin of the interposition to avoid future events.        Thank you for your consult. I will follow-up with patient. Please contact us if you have any additional questions.    Pascual Carter MD  Gastroenterology  [place] [facility]

## 2022-12-30 NOTE — ASSESSMENT & PLAN NOTE
Suspected from mass effect by colonic interposition and constipation    Needs AGGRESSIVE bowel regimen, at least 2 -3 enemas /  Day  Miralax, lactulose, etc  NG for decompression  Serial KUB  Appreciate surgical consult    Down the road, if symptoms recurrent, he may benefit from surgical correctin of the interposition to avoid future events.

## 2022-12-30 NOTE — HPI
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 The patient states over the past 3 or so days progressive , worsening abd distention, upper abd , worse than before, has had a couple similar episodes in past but not this bad. He endorses that he has had constipation in past, developed vomiting today that turned dark black with some red streaks. Last BM 3 days prior.   Has had a colonoscopy but this was aborted as they were unable to pass the scope the entire length of colon.     Ct scan was performed showing large amount of stool burden with fecal load, as well as colonic interposition likely 2/2 stool burden creating what seems to be mass effect on the duodenal C loop causing partial gastric outlet / upper GI obstruction. General Surgery consulted for possible surgical intervention of bowel obstruction.

## 2022-12-30 NOTE — ASSESSMENT & PLAN NOTE
Currently stable.    Needs continued decompression prior to any endosocpy    recs:  PPI IV BID  Ok to advance to clear liquids  Needs further laxatives and enemas and bowel decompression prior to EGD  Monitor Hgb    Will consider endoscopy pending clinical course, perhaps coming days or after holiday weekend, depending on the course.

## 2022-12-30 NOTE — ASSESSMENT & PLAN NOTE
Permanent suprapubic catheter  Hx of UTIs  UA w/PRO,  Occult blood +2, Arn[place] +3, deann on micro     PLAN:  Continue 1g rocephin qd (from ED)  F/u cultures

## 2022-12-30 NOTE — SUBJECTIVE & OBJECTIVE
No current facility-administered medications on file prior to encounter.     Current Outpatient Medications on File Prior to Encounter   Medication Sig    acetaminophen (TYLENOL) 325 MG tablet Take 650 mg by mouth every 6 (six) hours as needed for Temperature greater than (100.5).    baclofen (LIORESAL) 10 MG tablet Take 10 mg by mouth 2 (two) times a day. Morning and Afternoon    baclofen (LIORESAL) 20 MG tablet Take 20 mg by mouth nightly.    diltiaZEM (CARDIZEM) 60 MG tablet Take 60 mg by mouth 2 (two) times a day.    docusate sodium (COLACE) 100 MG capsule Take 100 mg by mouth 2 (two) times daily.    ferrous sulfate (FEOSOL) 325 mg (65 mg iron) Tab tablet Take 325 mg by mouth once daily.    furosemide (LASIX) 20 MG tablet Take 20 mg by mouth every other day.    gabapentin (NEURONTIN) 600 MG tablet Take 600 mg by mouth 3 (three) times daily.    guaiFENesin (MUCINEX) 600 mg 12 hr tablet Take 600 mg by mouth 2 (two) times daily as needed for Cough or Congestion.    guaiFENesin 100 mg/5 ml (ROBITUSSIN) 100 mg/5 mL syrup Take 200 mg by mouth every 4 (four) hours as needed for Cough.    hydrocodone-acetaminophen (HYCET) solution 7.5-325 mg/15mL Take 15 mLs by mouth every 8 (eight) hours as needed for Pain.    lisinopriL (PRINIVIL,ZESTRIL) 5 MG tablet Take 5 mg by mouth once daily.    magnesium oxide (MAG-OX) 400 mg (241.3 mg magnesium) tablet Take 400 mg by mouth once daily. At 12 noon    melatonin (MELATIN) 5 mg Take 5 mg by mouth every evening.    multivitamin (THERAGRAN) per tablet Take 1 tablet by mouth once daily.    nicotine (NICODERM CQ) 14 mg/24 hr Place 1 patch onto the skin once daily.    ondansetron (ZOFRAN) 4 MG tablet Take 4 mg by mouth every 6 (six) hours as needed for Nausea (or vomiting).    polyethylene glycol (GLYCOLAX) 17 gram/dose powder Take 17 g by mouth once daily. In 8 ounces of water    rOPINIRole (REQUIP) 4 MG tablet Take 4 mg by mouth every evening.    senna (SENOKOT) 8.6 mg tablet Take 2  tablets by mouth nightly as needed for Constipation.    sodium chloride 1 gram tablet Take 1 g by mouth once daily.    tamsulosin (FLOMAX) 0.4 mg Cap Take 0.4 mg by mouth every evening.    tiZANidine (ZANAFLEX) 6 mg capsule Take 6 mg by mouth every morning.    tiZANidine (ZANAFLEX) 6 mg capsule Take 12 mg by mouth every evening. 2 capsules    traZODone (DESYREL) 100 MG tablet Take 200 mg by mouth every evening.       Review of patient's allergies indicates:  No Known Allergies    No past medical history on file.  No past surgical history on file.  Family History    None       Tobacco Use    Smoking status: Not on file    Smokeless tobacco: Not on file   Substance and Sexual Activity    Alcohol use: Not on file    Drug use: Not on file    Sexual activity: Not on file     Review of Systems negative except per HPI  Objective:     Vital Signs (Most Recent):  Temp: 99 °F (37.2 °C) (12/30/22 1114)  Pulse: 68 (12/30/22 1145)  Resp: 18 (12/30/22 1114)  BP: (!) 99/57 (12/30/22 1114)  SpO2: (!) 91 % (12/30/22 1004)   Vital Signs (24h Range):  Temp:  [97 °F (36.1 °C)-99 °F (37.2 °C)] 99 °F (37.2 °C)  Pulse:  [58-90] 68  Resp:  [16-24] 18  SpO2:  [83 %-99 %] 91 %  BP: ()/(46-65) 99/57     Weight: 51.7 kg (113 lb 15.7 oz)  Body mass index is 30.81 kg/m².    Physical Exam  Vitals and nursing note reviewed.   Constitutional:       General: He is not in acute distress.  HENT:      Head: Normocephalic and atraumatic.   Cardiovascular:      Rate and Rhythm: Normal rate and regular rhythm.      Pulses: Normal pulses.   Pulmonary:      Effort: Pulmonary effort is normal.   Abdominal:      General: There is distension.      Palpations: There is mass (large stool burden palpable).      Tenderness: There is abdominal tenderness. There is no guarding.   Skin:     General: Skin is warm and dry.   Neurological:      General: No focal deficit present.      Mental Status: He is alert and oriented to person, place, and time.    Psychiatric:         Mood and Affect: Mood normal.       Significant Labs:  I have reviewed all pertinent lab results within the past 24 hours.  CBC:   Recent Labs   Lab 12/30/22  0510   WBC 11.23   RBC 4.35*   HGB 12.3*   HCT 39.4*      MCV 91   MCH 28.3   MCHC 31.2*     CMP:   Recent Labs   Lab 12/30/22  0510   GLU 71   CALCIUM 8.6*   ALBUMIN 3.3*   PROT 6.8      K 4.0   CO2 29   CL 93*   BUN 66*   CREATININE 1.0   ALKPHOS 79   ALT 19   AST 30   BILITOT 0.3       Significant Diagnostics:  I have reviewed all pertinent imaging results/findings within the past 24 hours.

## 2022-12-30 NOTE — HOSPITAL COURSE
Patient remained stable entire hospital course. He did not have any reoccurrence of hematemesis. He was started on aggressive bowel regimen for a goal of 2-3 BM daily to reduce stool burden. Surgery saw the patient and did not diagnose a bowel obstruction. They advised conservative management and GI consult. GI suggested IV protonix and NG stomach decompression. They stated they would pass EGD after stool burden was relieved. They recommended enemas TID. The patient's Hbg remained stable and did not require any blood transfusions. Patient was given several enemas with improvement. Bowel regimen was de-escalated. NG tube was removed on 1/2 after patient tolerated regular diet. Patient's suprapubic catheter was changed by Urology on 1/2/23 with no re-occurrence of leaking from incision site. EGD performed on 1/3/23 which showed grade D esophagitis, for which GI recommended PPI BID for 8 weeks and repeat EGD to assess for therapy response. He was initially put on IV rocephin for UTI 2/2 indwelling chronic suprapubic catheter. ID consulted on 1/3 for UC showing E.Coli and Proteus with concern for ESBL. Patient was switched to Meropenem on 1/3 and will continue for a total of 7 days ending on 1/10. Midline was placed for patient to receive IV antibiotics. Patient medically stable for discharge back to [facility]. Return precautions discussed. All questions answered. Patient verbalized understanding.

## 2022-12-30 NOTE — ASSESSMENT & PLAN NOTE
HDS    PPI IV BID  NPO  Will consider endoscopy once obstuctive symptoms controlled, earliest tomorrow possibly

## 2022-12-30 NOTE — HPI
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  GI consulted for gi bleed  Pt states past 3 or so days progressive , worsening abd distention, upper abd , worse than before, has had a couple similar episodes in past but not this bad, has had constipation in past, developed vomiting today that turned dark black with some red streaks. Last BM 3 days prior.   He is not sure if he has had prior endoscopy    Ct showing marked constipation with fecal load, as well as colonic interposition creating what seems to be mass effect on the duodenal C loop causing partial gastric outlet / upper GI obstruction.

## 2022-12-30 NOTE — CONSULTS
[facility] Surg  General Surgery  Consult Note    Patient Name: Shaw Heredia  MRN: [id number]  Code Status: Full Code  Admission Date: 12/29/2022  Hospital Length of Stay: 0 days  Attending Physician: Marychuy Brand MD  Primary Care Provider: Primary Doctor No    Patient information was obtained from patient and ER records.     Inpatient consult to General Surgery  Consult performed by: Keyshawn Negron MD  Consult ordered by: Robi Franklin MD        Subjective:     Principal Problem: GI bleed    History of Present Illness: This is 63 y/o male hx dwarfism, resident of [facility] here for abd distention, and vomiting with coffee grounds. The patient states over the past 3 or so days progressive , worsening abd distention, upper abd , worse than before, has had a couple similar episodes in past but not this bad. He endorses that he has had constipation in past, developed vomiting today that turned dark black with some red streaks. Last BM 3 days prior.   Has had a colonoscopy but this was aborted as they were unable to pass the scope the entire length of colon.     Ct scan was performed showing large amount of stool burden with fecal load, as well as colonic interposition likely 2/2 stool burden creating what seems to be mass effect on the duodenal C loop causing partial gastric outlet / upper GI obstruction. General Surgery consulted for possible surgical intervention of bowel obstruction.       No current facility-administered medications on file prior to encounter.     Current Outpatient Medications on File Prior to Encounter   Medication Sig    acetaminophen (TYLENOL) 325 MG tablet Take 650 mg by mouth every 6 (six) hours as needed for Temperature greater than (100.5).    baclofen (LIORESAL) 10 MG tablet Take 10 mg by mouth 2 (two) times a day. Morning and Afternoon    baclofen (LIORESAL) 20 MG tablet Take 20 mg by mouth nightly.    diltiaZEM (CARDIZEM) 60 MG tablet Take 60 mg by mouth 2  (two) times a day.    docusate sodium (COLACE) 100 MG capsule Take 100 mg by mouth 2 (two) times daily.    ferrous sulfate (FEOSOL) 325 mg (65 mg iron) Tab tablet Take 325 mg by mouth once daily.    furosemide (LASIX) 20 MG tablet Take 20 mg by mouth every other day.    gabapentin (NEURONTIN) 600 MG tablet Take 600 mg by mouth 3 (three) times daily.    guaiFENesin (MUCINEX) 600 mg 12 hr tablet Take 600 mg by mouth 2 (two) times daily as needed for Cough or Congestion.    guaiFENesin 100 mg/5 ml (ROBITUSSIN) 100 mg/5 mL syrup Take 200 mg by mouth every 4 (four) hours as needed for Cough.    hydrocodone-acetaminophen (HYCET) solution 7.5-325 mg/15mL Take 15 mLs by mouth every 8 (eight) hours as needed for Pain.    lisinopriL (PRINIVIL,ZESTRIL) 5 MG tablet Take 5 mg by mouth once daily.    magnesium oxide (MAG-OX) 400 mg (241.3 mg magnesium) tablet Take 400 mg by mouth once daily. At 12 noon    melatonin (MELATIN) 5 mg Take 5 mg by mouth every evening.    multivitamin (THERAGRAN) per tablet Take 1 tablet by mouth once daily.    nicotine (NICODERM CQ) 14 mg/24 hr Place 1 patch onto the skin once daily.    ondansetron (ZOFRAN) 4 MG tablet Take 4 mg by mouth every 6 (six) hours as needed for Nausea (or vomiting).    polyethylene glycol (GLYCOLAX) 17 gram/dose powder Take 17 g by mouth once daily. In 8 ounces of water    rOPINIRole (REQUIP) 4 MG tablet Take 4 mg by mouth every evening.    senna (SENOKOT) 8.6 mg tablet Take 2 tablets by mouth nightly as needed for Constipation.    sodium chloride 1 gram tablet Take 1 g by mouth once daily.    tamsulosin (FLOMAX) 0.4 mg Cap Take 0.4 mg by mouth every evening.    tiZANidine (ZANAFLEX) 6 mg capsule Take 6 mg by mouth every morning.    tiZANidine (ZANAFLEX) 6 mg capsule Take 12 mg by mouth every evening. 2 capsules    traZODone (DESYREL) 100 MG tablet Take 200 mg by mouth every evening.       Review of patient's allergies indicates:  No Known  Allergies    No past medical history on file.  No past surgical history on file.  Family History    None       Tobacco Use    Smoking status: Not on file    Smokeless tobacco: Not on file   Substance and Sexual Activity    Alcohol use: Not on file    Drug use: Not on file    Sexual activity: Not on file     Review of Systems negative except per HPI  Objective:     Vital Signs (Most Recent):  Temp: 99 °F (37.2 °C) (12/30/22 1114)  Pulse: 68 (12/30/22 1145)  Resp: 18 (12/30/22 1114)  BP: (!) 99/57 (12/30/22 1114)  SpO2: (!) 91 % (12/30/22 1004)   Vital Signs (24h Range):  Temp:  [97 °F (36.1 °C)-99 °F (37.2 °C)] 99 °F (37.2 °C)  Pulse:  [58-90] 68  Resp:  [16-24] 18  SpO2:  [83 %-99 %] 91 %  BP: ()/(46-65) 99/57     Weight: 51.7 kg (113 lb 15.7 oz)  Body mass index is 30.81 kg/m².    Physical Exam  Vitals and nursing note reviewed.   Constitutional:       General: He is not in acute distress.  HENT:      Head: Normocephalic and atraumatic.   Cardiovascular:      Rate and Rhythm: Normal rate and regular rhythm.      Pulses: Normal pulses.   Pulmonary:      Effort: Pulmonary effort is normal.   Abdominal:      General: There is distension.      Palpations: There is mass (large stool burden palpable).      Tenderness: There is abdominal tenderness. There is no guarding.   Skin:     General: Skin is warm and dry.   Neurological:      General: No focal deficit present.      Mental Status: He is alert and oriented to person, place, and time.   Psychiatric:         Mood and Affect: Mood normal.       Significant Labs:  I have reviewed all pertinent lab results within the past 24 hours.  CBC:   Recent Labs   Lab 12/30/22  0510   WBC 11.23   RBC 4.35*   HGB 12.3*   HCT 39.4*      MCV 91   MCH 28.3   MCHC 31.2*     CMP:   Recent Labs   Lab 12/30/22  0510   GLU 71   CALCIUM 8.6*   ALBUMIN 3.3*   PROT 6.8      K 4.0   CO2 29   CL 93*   BUN 66*   CREATININE 1.0   ALKPHOS 79   ALT 19   AST 30   BILITOT 0.3        Significant Diagnostics:  I have reviewed all pertinent imaging results/findings within the past 24 hours.      Assessment/Plan:     Constipation  61 yo male with history of constipation and large stool burden, concern for obstruction. Scan reviewed with staff, no appreciable obstruction noted.     Agree with GI recommendations, needs fiber, enemas, heavy duty bowel regimen  No surgical intervention at this time  Will continue to follow  Rest of care per primary          VTE Risk Mitigation (From admission, onward)         Ordered     IP VTE HIGH RISK PATIENT  Once         12/29/22 1537     Place sequential compression device  Until discontinued         12/29/22 1537                Thank you for your consult. I will follow-up with patient. Please contact us if you have any additional questions.    Keyshawn Negron MD  General Surgery  [facility]

## 2022-12-30 NOTE — PROGRESS NOTES
Pt arrived to unit. Introduced self as VN for this shift. Admission questions completed by VN. Educated pt on safety precautions, and VN's role in pt care. Opportunity given for pt's questions. All questions answered.    12/29/22 1769   Nurse Notification   Bedside Nurse Notified? Yes   Name of Bedside Nurse Rebecca Nazario RN   Nurse Notfication Method Secure Chat   Nurse Notified Of Medications  (nausea)   Admission   Initial VN Admission Questions Complete   Communication Issues? None   Shift   Virtual Nurse - Patient Verbalized Approval Of Camera Use   Type of Frequent Check   Type Other (see comments)  (Admission)   Safety/Activity   Patient Rounds bed in low position;visualized patient;call light in patient/parent reach;placement of personal items at bedside   Safety Promotion/Fall Prevention assistive device/personal item within reach;Fall Risk reviewed with patient/family;medications reviewed;side rails raised x 3   Positioning   Body Position supine   Head of Bed (HOB) Positioning HOB at 30-45 degrees   Pain/Comfort/Sleep   Preferred Pain Scale number (Numeric Rating Pain Scale)   Pain Rating (0-10): Rest 0

## 2022-12-30 NOTE — SUBJECTIVE & OBJECTIVE
No past medical history on file.    No past surgical history on file.    Review of patient's allergies indicates:  No Known Allergies  Family History    None       Tobacco Use    Smoking status: Not on file    Smokeless tobacco: Not on file   Substance and Sexual Activity    Alcohol use: Not on file    Drug use: Not on file    Sexual activity: Not on file     Review of Systems   Constitutional:  Positive for activity change, appetite change and fatigue. Negative for chills and fever.   HENT:  Negative for facial swelling, mouth sores and trouble swallowing.    Eyes:  Negative for pain and redness.   Respiratory:  Negative for cough and shortness of breath.    Cardiovascular:  Negative for chest pain and leg swelling.   Gastrointestinal:  Positive for abdominal distention, nausea and vomiting.   Genitourinary:  Negative for dysuria and hematuria.   Musculoskeletal:  Negative for gait problem and neck stiffness.   Skin:  Negative for rash and wound.   Neurological:  Positive for weakness. Negative for seizures and headaches.   Psychiatric/Behavioral:  Negative for confusion and hallucinations.    Objective:     Vital Signs (Most Recent):  Temp: 98.5 °F (36.9 °C) (12/29/22 1937)  Pulse: 71 (12/29/22 2008)  Resp: 18 (12/29/22 2008)  BP: (!) 90/52 (12/29/22 2000)  SpO2: 96 % (12/29/22 2008)   Vital Signs (24h Range):  Temp:  [98.1 °F (36.7 °C)-99.1 °F (37.3 °C)] 98.5 °F (36.9 °C)  Pulse:  [] 71  Resp:  [16-24] 18  SpO2:  [94 %-99 %] 96 %  BP: ()/(46-69) 90/52     Weight: 59.9 kg (132 lb) (12/29/22 1034)  Body mass index is 35.68 kg/m².    No intake or output data in the 24 hours ending 12/29/22 2217    Lines/Drains/Airways       Drain  Duration                  Urethral Catheter 08/15/18 1445 Double-lumen 16 Fr. 1597 days         NG/OG Tube 12/29/22 1637 14 Fr. Right nostril <1 day              Peripheral Intravenous Line  Duration                  Peripheral IV - Single Lumen 12/29/22 1034 20 G  Left;Posterior Hand <1 day         Peripheral IV - Single Lumen 12/29/22 1106 18 G Posterior;Right Hand <1 day                    Physical Exam  Vitals reviewed.   Constitutional:       General: He is not in acute distress.     Appearance: He is well-developed.   HENT:      Head: Normocephalic and atraumatic.   Eyes:      General: No scleral icterus.     Conjunctiva/sclera: Conjunctivae normal.   Neck:      Thyroid: No thyromegaly.   Cardiovascular:      Rate and Rhythm: Normal rate and regular rhythm.   Pulmonary:      Effort: Pulmonary effort is normal. No respiratory distress.      Breath sounds: Normal breath sounds.   Abdominal:      General: There is distension (signifcant typmany upper abd ; nonrigid).      Palpations: Abdomen is soft.   Genitourinary:     Comments: Suprapubic catheter  Musculoskeletal:         General: No tenderness. Normal range of motion.      Cervical back: Neck supple.   Lymphadenopathy:      Cervical: No cervical adenopathy.   Skin:     General: Skin is warm and dry.      Findings: No rash.   Neurological:      Mental Status: He is alert and oriented to person, place, and time.   Psychiatric:         Mood and Affect: Mood normal.         Behavior: Behavior normal.       Significant Labs:  Blood Culture: No results for input(s): LABBLOO in the last 48 hours.  CBC:   Recent Labs   Lab 12/29/22  1106   WBC 15.54*   HGB 13.9*   HCT 44.3        BMP:   Recent Labs   Lab 12/29/22  1106   *      K 3.5   CL 86*   CO2 39*   BUN 60*   CREATININE 1.0   CALCIUM 9.5     CMP:   Recent Labs   Lab 12/29/22  1106   *   CALCIUM 9.5   ALBUMIN 3.8   PROT 7.9      K 3.5   CO2 39*   CL 86*   BUN 60*   CREATININE 1.0   ALKPHOS 82   ALT 21   AST 25   BILITOT 0.5     Coagulation:   Recent Labs   Lab 12/29/22  1106   INR 1.0     CRP: No results for input(s): CRP in the last 48 hours.  ESR: No results for input(s): SEDRATE in the last 48 hours.  H.Pylori Ab IgG: No results for  input(s): HPYLORIIGG in the last 48 hours.  Lipase:   Recent Labs   Lab 12/29/22  1106   LIPASE 32     Liver Function Test:   Recent Labs   Lab 12/29/22  1106   ALT 21   AST 25   ALKPHOS 82   BILITOT 0.5   PROT 7.9   ALBUMIN 3.8     Peritoneal Fluid Cultures: No results for input(s): AEROBICCULTU, LABGRAM in the last 48 hours.    Significant Imaging:  Imaging results within the past 24 hours have been reviewed.

## 2022-12-30 NOTE — NURSING
Notified MD on pt recent BP of 119/56 however BP has been running low w/ SBP between 80-90 including manual BP.  MD notified of Cardizem held and gave ok to continue to hold due to BP running mostly low and to reassess with next set of routine vitals.

## 2022-12-30 NOTE — NURSING
MD called about NGT impression. Verbalized understanding. Will connect pt to suction once order placed.

## 2022-12-30 NOTE — PROGRESS NOTES
[facility] Surg  Gastroenterology  Progress Note    Patient Name: Shaw Heredia  MRN: [id number]  Admission Date: 12/29/2022  Hospital Length of Stay: 0 days  Code Status: Full Code   Attending Provider: Marychuy Brand MD  Consulting Provider: Pascual Carter MD  Primary Care Physician: Primary Doctor No  Principal Problem: GI bleed      Subjective:     Interval History:   Ng in place. Currently stable. Had large bm overnight. No radiating symptoms. No vomitng.  Ng output appears feculent with dark material      Review of Systems   Constitutional:  Negative for chills and fever.   Respiratory:  Negative for choking and chest tightness.    Gastrointestinal:  Positive for abdominal pain and nausea. Negative for vomiting.   Neurological:  Negative for dizziness and headaches.   Psychiatric/Behavioral:  Negative for agitation and behavioral problems.    Objective:     Vital Signs (Most Recent):  Temp: 99 °F (37.2 °C) (12/30/22 1114)  Pulse: 68 (12/30/22 1145)  Resp: 18 (12/30/22 1114)  BP: (!) 99/57 (12/30/22 1114)  SpO2: (!) 91 % (12/30/22 1004)   Vital Signs (24h Range):  Temp:  [97 °F (36.1 °C)-99 °F (37.2 °C)] 99 °F (37.2 °C)  Pulse:  [58-93] 68  Resp:  [16-24] 18  SpO2:  [83 %-99 %] 91 %  BP: ()/(46-65) 99/57     Weight: 51.7 kg (113 lb 15.7 oz) (12/29/22 2339)  Body mass index is 30.81 kg/m².      Intake/Output Summary (Last 24 hours) at 12/30/2022 1241  Last data filed at 12/30/2022 0642  Gross per 24 hour   Intake 240 ml   Output 2550 ml   Net -2310 ml       Lines/Drains/Airways       Drain  Duration                  NG/OG Tube 12/29/22 1637 14 Fr. Right nostril <1 day         Suprapubic Catheter 12/29/22 1900 <1 day              Peripheral Intravenous Line  Duration                  Peripheral IV - Single Lumen 12/29/22 1034 20 G Left;Posterior Hand 1 day         Peripheral IV - Single Lumen 12/29/22 1106 18 G Posterior;Right Hand 1 day                    Physical Exam  Vitals reviewed.    Constitutional:       Appearance: He is not diaphoretic.   HENT:      Nose:      Comments: Ng in nare  Abdominal:      Comments: Improved distention ; soft   Skin:     General: Skin is dry.      Coloration: Skin is not pale.   Neurological:      Mental Status: He is alert and oriented to person, place, and time. Mental status is at baseline.   Psychiatric:         Mood and Affect: Mood normal.         Behavior: Behavior normal.       Significant Labs:  CBC:   Recent Labs   Lab 12/29/22  1106 12/29/22  1629 12/30/22  0510   WBC 15.54*  --  11.23   HGB 13.9*  --  12.3*   HCT 44.3 45 39.4*     --  275     BMP:   Recent Labs   Lab 12/30/22  0510   GLU 71      K 4.0   CL 93*   CO2 29   BUN 66*   CREATININE 1.0   CALCIUM 8.6*   MG 3.0*     CMP:   Recent Labs   Lab 12/30/22  0510   GLU 71   CALCIUM 8.6*   ALBUMIN 3.3*   PROT 6.8      K 4.0   CO2 29   CL 93*   BUN 66*   CREATININE 1.0   ALKPHOS 79   ALT 19   AST 30   BILITOT 0.3         Significant Imaging:  Imaging results within the past 24 hours have been reviewed.    Assessment/Plan:     * GI bleed  Currently stable.    Needs continued decompression prior to any endosocpy    recs:  PPI IV BID  Ok to advance to clear liquids  Needs further laxatives and enemas and bowel decompression prior to EGD  Monitor Hgb    Will consider endoscopy pending clinical course, perhaps coming days or after holiday weekend, depending on the course.            Thank you for your consult. I will follow-up with patient. Please contact us if you have any additional questions.    Pascual Carter MD  Gastroenterology  [facility]

## 2022-12-30 NOTE — SUBJECTIVE & OBJECTIVE
Subjective:     Interval History:   Ng in place. Currently stable. Had large bm overnight. No radiating symptoms. No vomitng.  Ng output appears feculent with dark material      Review of Systems   Constitutional:  Negative for chills and fever.   Respiratory:  Negative for choking and chest tightness.    Gastrointestinal:  Positive for abdominal pain and nausea. Negative for vomiting.   Neurological:  Negative for dizziness and headaches.   Psychiatric/Behavioral:  Negative for agitation and behavioral problems.    Objective:     Vital Signs (Most Recent):  Temp: 99 °F (37.2 °C) (12/30/22 1114)  Pulse: 68 (12/30/22 1145)  Resp: 18 (12/30/22 1114)  BP: (!) 99/57 (12/30/22 1114)  SpO2: (!) 91 % (12/30/22 1004)   Vital Signs (24h Range):  Temp:  [97 °F (36.1 °C)-99 °F (37.2 °C)] 99 °F (37.2 °C)  Pulse:  [58-93] 68  Resp:  [16-24] 18  SpO2:  [83 %-99 %] 91 %  BP: ()/(46-65) 99/57     Weight: 51.7 kg (113 lb 15.7 oz) (12/29/22 2339)  Body mass index is 30.81 kg/m².      Intake/Output Summary (Last 24 hours) at 12/30/2022 1241  Last data filed at 12/30/2022 0642  Gross per 24 hour   Intake 240 ml   Output 2550 ml   Net -2310 ml       Lines/Drains/Airways       Drain  Duration                  NG/OG Tube 12/29/22 1637 14 Fr. Right nostril <1 day         Suprapubic Catheter 12/29/22 1900 <1 day              Peripheral Intravenous Line  Duration                  Peripheral IV - Single Lumen 12/29/22 1034 20 G Left;Posterior Hand 1 day         Peripheral IV - Single Lumen 12/29/22 1106 18 G Posterior;Right Hand 1 day                    Physical Exam  Vitals reviewed.   Constitutional:       Appearance: He is not diaphoretic.   HENT:      Nose:      Comments: Ng in nare  Abdominal:      Comments: Improved distention ; soft   Skin:     General: Skin is dry.      Coloration: Skin is not pale.   Neurological:      Mental Status: He is alert and oriented to person, place, and time. Mental status is at baseline.    Psychiatric:         Mood and Affect: Mood normal.         Behavior: Behavior normal.       Significant Labs:  CBC:   Recent Labs   Lab 12/29/22  1106 12/29/22  1629 12/30/22  0510   WBC 15.54*  --  11.23   HGB 13.9*  --  12.3*   HCT 44.3 45 39.4*     --  275     BMP:   Recent Labs   Lab 12/30/22  0510   GLU 71      K 4.0   CL 93*   CO2 29   BUN 66*   CREATININE 1.0   CALCIUM 8.6*   MG 3.0*     CMP:   Recent Labs   Lab 12/30/22  0510   GLU 71   CALCIUM 8.6*   ALBUMIN 3.3*   PROT 6.8      K 4.0   CO2 29   CL 93*   BUN 66*   CREATININE 1.0   ALKPHOS 79   ALT 19   AST 30   BILITOT 0.3         Significant Imaging:  Imaging results within the past 24 hours have been reviewed.

## 2022-12-30 NOTE — PLAN OF CARE
SOCIAL WORK DISCHARGE PLANNING ASSESSMENT    Sw completed discharge planning assessment with pt in pt's room K516. Pt was easily engaged and education on the role of  was provided. Pt stated he resides at [facility]. Pt stated he has been living there for 8-10 years. Pt is agreeable to return to [facility] upon discharge. Pt reported his biggest support system is his sister Sussy [phone]. Pt stated he has the following DME at [facility]: wc and home O2. Pt will need transportation upon discharge to return to [facility]. Pt was encouraged to call with any questions or concerns. Pt verbalized understanding.     Sw sent referral to [facility] via careport, so therefore patient can return upon discharge.     Sw will continue to follow.       Patient Active Problem List   Diagnosis    GI bleed    Constipation    UTI (urinary tract infection)    Muscle spasms of both lower extremities    HTN (hypertension)    Partial gastric outlet obstruction        12/30/22 1134   Discharge Assessment   Assessment Type Discharge Planning Assessment   Confirmed/corrected address, phone number and insurance Yes   Confirmed Demographics Correct on Facesheet   Source of Information patient   Communicated OLIVER with patient/caregiver Date not available/Unable to determine   People in Home facility resident   Facility Arrived From: [facility]   Do you expect to return to your current living situation? Yes   Do you have help at home or someone to help you manage your care at home? Yes   Who are your caregiver(s) and their phone number(s)? pt's sister Sussy White [phone]   Prior to hospitilization cognitive status: Alert/Oriented   Current cognitive status: Alert/Oriented   Walking or Climbing Stairs ambulation difficulty, requires equipment   Equipment Currently Used at Home oxygen;wheelchair   Readmission within 30 days? No   Patient currently being  followed by outpatient case management? No   Do you have prescription coverage? Yes   Coverage Medicaid   Do you have any problems affording any of your prescribed medications? No   Is the patient taking medications as prescribed? yes   Who is going to help you get home at discharge? pt's sister Sussy White [phone]   How do you get to doctors appointments? health plan transportation   Discharge Plan A Return to nursing home   DME Needed Upon Discharge  none   Discharge Plan discussed with: Patient   Discharge Barriers Identified None

## 2022-12-30 NOTE — ASSESSMENT & PLAN NOTE
States last BM was 3 days ago  Distended abd, mildly tender  Denies diarrhea, blood in stool  Pos bowel sounds  CT: 1. Large colonic and rectal stool burden resulting suggesting constipation, with rectal impaction not excluded.  Convincing evidence for associated small bowel obstruction or stercoral proctocolitis at this time.  2. Marked distension of the stomach to the level of the duodenal C-loop which remains patent, likely related to mass effect from stool-filled and distended right hepatic flexure large bowel loops with concern for least partial/developing gastric outlet obstruction.  Correlation advised and further evaluation/follow-up as warranted.  Surgical consultation may be warranted.  3. Elevated left hemidiaphragm presumably related to mass effect from marked gastric distension, with resultant left lower lobe atelectasis volume loss and leftward shift of the heart and lower mediastinum.  Bibasilar dependent atelectasis with scattered air bronchograms, for which superimposed aspiration or pneumonia is not excluded.  Had BM before enema (enema not performed)  Surgery not recommending procedure     PLAN:  Miralax, dulcolax  Soap suds enema  NG decompression  Surgical consult, f/u recs

## 2022-12-31 LAB
ALBUMIN SERPL BCP-MCNC: 2.8 G/DL (ref 3.5–5.2)
ALP SERPL-CCNC: 63 U/L (ref 55–135)
ALT SERPL W/O P-5'-P-CCNC: 16 U/L (ref 10–44)
ANION GAP SERPL CALC-SCNC: 14 MMOL/L (ref 8–16)
AST SERPL-CCNC: 23 U/L (ref 10–40)
BASOPHILS # BLD AUTO: 0.01 K/UL (ref 0–0.2)
BASOPHILS NFR BLD: 0.2 % (ref 0–1.9)
BILIRUB SERPL-MCNC: 0.5 MG/DL (ref 0.1–1)
BUN SERPL-MCNC: 38 MG/DL (ref 6–20)
CALCIUM SERPL-MCNC: 8.1 MG/DL (ref 8.7–10.5)
CHLORIDE SERPL-SCNC: 90 MMOL/L (ref 95–110)
CO2 SERPL-SCNC: 35 MMOL/L (ref 23–29)
CREAT SERPL-MCNC: 0.6 MG/DL (ref 0.5–1.4)
DIFFERENTIAL METHOD: ABNORMAL
EOSINOPHIL # BLD AUTO: 0 K/UL (ref 0–0.5)
EOSINOPHIL NFR BLD: 0.2 % (ref 0–8)
ERYTHROCYTE [DISTWIDTH] IN BLOOD BY AUTOMATED COUNT: 13.1 % (ref 11.5–14.5)
EST. GFR  (NO RACE VARIABLE): >60 ML/MIN/1.73 M^2
GLUCOSE SERPL-MCNC: 72 MG/DL (ref 70–110)
HCT VFR BLD AUTO: 32.8 % (ref 40–54)
HGB BLD-MCNC: 10.4 G/DL (ref 14–18)
IMM GRANULOCYTES # BLD AUTO: 0.02 K/UL (ref 0–0.04)
IMM GRANULOCYTES NFR BLD AUTO: 0.3 % (ref 0–0.5)
LYMPHOCYTES # BLD AUTO: 1.2 K/UL (ref 1–4.8)
LYMPHOCYTES NFR BLD: 18.1 % (ref 18–48)
MAGNESIUM SERPL-MCNC: 2.3 MG/DL (ref 1.6–2.6)
MCH RBC QN AUTO: 28.8 PG (ref 27–31)
MCHC RBC AUTO-ENTMCNC: 31.7 G/DL (ref 32–36)
MCV RBC AUTO: 91 FL (ref 82–98)
MONOCYTES # BLD AUTO: 0.3 K/UL (ref 0.3–1)
MONOCYTES NFR BLD: 4.6 % (ref 4–15)
NEUTROPHILS # BLD AUTO: 5.1 K/UL (ref 1.8–7.7)
NEUTROPHILS NFR BLD: 76.6 % (ref 38–73)
NRBC BLD-RTO: 0 /100 WBC
OB PNL STL: NEGATIVE
PHOSPHATE SERPL-MCNC: 2.2 MG/DL (ref 2.7–4.5)
PLATELET # BLD AUTO: 231 K/UL (ref 150–450)
PMV BLD AUTO: 9 FL (ref 9.2–12.9)
POTASSIUM SERPL-SCNC: 3 MMOL/L (ref 3.5–5.1)
PROT SERPL-MCNC: 5.6 G/DL (ref 6–8.4)
RBC # BLD AUTO: 3.61 M/UL (ref 4.6–6.2)
SODIUM SERPL-SCNC: 139 MMOL/L (ref 136–145)
WBC # BLD AUTO: 6.58 K/UL (ref 3.9–12.7)

## 2022-12-31 PROCEDURE — 85025 COMPLETE CBC W/AUTO DIFF WBC: CPT

## 2022-12-31 PROCEDURE — 80053 COMPREHEN METABOLIC PANEL: CPT

## 2022-12-31 PROCEDURE — 36415 COLL VENOUS BLD VENIPUNCTURE: CPT

## 2022-12-31 PROCEDURE — 84100 ASSAY OF PHOSPHORUS: CPT

## 2022-12-31 PROCEDURE — 82272 OCCULT BLD FECES 1-3 TESTS: CPT

## 2022-12-31 PROCEDURE — 11000001 HC ACUTE MED/SURG PRIVATE ROOM

## 2022-12-31 PROCEDURE — G0378 HOSPITAL OBSERVATION PER HR: HCPCS

## 2022-12-31 PROCEDURE — 87040 BLOOD CULTURE FOR BACTERIA: CPT | Performed by: STUDENT IN AN ORGANIZED HEALTH CARE EDUCATION/TRAINING PROGRAM

## 2022-12-31 PROCEDURE — 83735 ASSAY OF MAGNESIUM: CPT

## 2022-12-31 PROCEDURE — 25000003 PHARM REV CODE 250: Performed by: STUDENT IN AN ORGANIZED HEALTH CARE EDUCATION/TRAINING PROGRAM

## 2022-12-31 PROCEDURE — C9113 INJ PANTOPRAZOLE SODIUM, VIA: HCPCS

## 2022-12-31 PROCEDURE — 25000003 PHARM REV CODE 250

## 2022-12-31 PROCEDURE — 63600175 PHARM REV CODE 636 W HCPCS

## 2022-12-31 RX ORDER — POTASSIUM CHLORIDE 20 MEQ/1
40 TABLET, EXTENDED RELEASE ORAL ONCE
Status: COMPLETED | OUTPATIENT
Start: 2022-12-31 | End: 2022-12-31

## 2022-12-31 RX ADMIN — GABAPENTIN 600 MG: 300 CAPSULE ORAL at 09:12

## 2022-12-31 RX ADMIN — ROPINIROLE HYDROCHLORIDE 4 MG: 1 TABLET, FILM COATED ORAL at 08:12

## 2022-12-31 RX ADMIN — LACTULOSE 15 G: 10 SOLUTION ORAL at 08:12

## 2022-12-31 RX ADMIN — POLYETHYLENE GLYCOL 3350 17 G: 17 POWDER, FOR SOLUTION ORAL at 09:12

## 2022-12-31 RX ADMIN — TIZANIDINE 6 MG: 2 TABLET ORAL at 05:12

## 2022-12-31 RX ADMIN — SENNOSIDES 8.6 MG: 8.6 TABLET, FILM COATED ORAL at 09:12

## 2022-12-31 RX ADMIN — Medication 1 TABLET: at 09:12

## 2022-12-31 RX ADMIN — DILTIAZEM HYDROCHLORIDE 60 MG: 30 TABLET, FILM COATED ORAL at 08:12

## 2022-12-31 RX ADMIN — Medication 6 MG: at 08:12

## 2022-12-31 RX ADMIN — DILTIAZEM HYDROCHLORIDE 60 MG: 30 TABLET, FILM COATED ORAL at 09:12

## 2022-12-31 RX ADMIN — PANTOPRAZOLE SODIUM 40 MG: 40 INJECTION, POWDER, FOR SOLUTION INTRAVENOUS at 08:12

## 2022-12-31 RX ADMIN — MAGNESIUM OXIDE TAB 400 MG (241.3 MG ELEMENTAL MG) 400 MG: 400 (241.3 MG) TAB at 09:12

## 2022-12-31 RX ADMIN — ACETAMINOPHEN 650 MG: 325 TABLET ORAL at 08:12

## 2022-12-31 RX ADMIN — BACLOFEN 20 MG: 10 TABLET ORAL at 08:12

## 2022-12-31 RX ADMIN — DOCUSATE SODIUM 100 MG: 100 CAPSULE, LIQUID FILLED ORAL at 09:12

## 2022-12-31 RX ADMIN — DOCUSATE SODIUM 100 MG: 100 CAPSULE, LIQUID FILLED ORAL at 08:12

## 2022-12-31 RX ADMIN — LIDOCAINE 1 PATCH: 50 PATCH CUTANEOUS at 08:12

## 2022-12-31 RX ADMIN — POTASSIUM CHLORIDE 40 MEQ: 1500 TABLET, EXTENDED RELEASE ORAL at 01:12

## 2022-12-31 RX ADMIN — TAMSULOSIN HYDROCHLORIDE 0.4 MG: 0.4 CAPSULE ORAL at 08:12

## 2022-12-31 RX ADMIN — CEFTRIAXONE 1 G: 1 INJECTION, SOLUTION INTRAVENOUS at 01:12

## 2022-12-31 RX ADMIN — LACTULOSE 15 G: 10 SOLUTION ORAL at 02:12

## 2022-12-31 RX ADMIN — GABAPENTIN 600 MG: 300 CAPSULE ORAL at 08:12

## 2022-12-31 RX ADMIN — POTASSIUM CHLORIDE 40 MEQ: 1500 TABLET, EXTENDED RELEASE ORAL at 09:12

## 2022-12-31 RX ADMIN — LACTULOSE 15 G: 10 SOLUTION ORAL at 09:12

## 2022-12-31 RX ADMIN — GABAPENTIN 600 MG: 300 CAPSULE ORAL at 02:12

## 2022-12-31 RX ADMIN — PANTOPRAZOLE SODIUM 40 MG: 40 INJECTION, POWDER, FOR SOLUTION INTRAVENOUS at 09:12

## 2022-12-31 NOTE — NURSING
Two soap vanesa enemas were given today; pt had one large bowel movement after the first enema and one small bm after the second enema; patient states that he felt much better after enemas were completed.

## 2022-12-31 NOTE — PROGRESS NOTES
[facility] Medicine  Progress Note    Patient Name: Shaw Heredia  MRN: [id number]  Patient Class: OP- Observation   Admission Date: 12/29/2022  Length of Stay: 0 days  Attending Physician: Marychuy Brand MD  Primary Care Provider: Primary Doctor No        Subjective:     Principal Problem:GI bleed        HPI:  63 y/o male from [facility], pmhx: dwarfism, neurogenic bladder with suprapubic catheter, paraplegia (uses wheelchair), restless leg, muscle spasms, HTN, ileus, former etoh user, smoker,  uses home O2 (2lpm).    C/o abdominal bloating for the past 4 days along with coffee-ground and red-streaked emesis for two days (upwards of 10x). Denies bloody/dark stool, GERD, fever, chills, diarrhea,  chest pain, SOB. States last BM was 3 days ago.      In Emergency, 102/69, , 99.1F, SpO2  96% 3lpm. Labs were significant for WBC 15.54, Hgb 13.9 (2mo ago 11.5), Cl- 86, HCO3- 39, BUN 60, Cr wnl, anion gap 19, UA PRO +2, Occult blood +2, Leuks +3, many deann on micro. Trip,  lipase , INR, wnl. CXR showed evidence of distended abd. CT showed Large colonic and rectal stool burden resulting suggesting constipation, with rectal impaction not excluded. Convincing evidence for associated small bowel obstruction or stercoral proctocolitis at this time. Marked distension of the stomach to the level of the duodenal C-loop which remains patent, likely related to mass effect from stool-filled and distended right hepatic flexure large bowel loops with concern for least partial/developing gastric outlet obstruction. He was started on Rocephin 1g, IV protonix. GI and Surgery were consulted.  He was admitted for UTI, susp GI bleed and susp fecal impaction.         Overview/Hospital Course:  63 y/o male admitted for GI bleed (hematemesis), constipation with large stool burden and UTI. The patient felt well on arrival to our service with no weakness, nausea or vomiting. He was given miralax, and colace (no  enema) and had a large bowel movement in the evening. He was put on IV rocephin for the UTI. Surgery saw the patient and did not diagnose a bowel obstruction. They advised conservative management and GI consult. GI suggested IV protonix and NG stomach decompression. They stated they would pass EGD after stool burden was relieved. They recommended tid enemas. The patient's Hbg was stable overnight. He was given his first enema on 12/30 with a large amount of stool relieved. A second enema later on 12/30 was also effective, but to a lesser degree. A significant stool burden was suspected on repeat physical exam.       Interval History:   NAEON. VSSAF RA.  No BM overnight. Black liquid from NG tube, yellow urine from catheter. Denies further bleeding/vomiting. GI planning EGD after weekend/bowel regimen. Advancing diet as tolerated.    Review of Systems   Constitutional:  Positive for appetite change. Negative for fever.   Respiratory:  Negative for cough and shortness of breath.    Cardiovascular:  Negative for chest pain, palpitations and leg swelling.   Gastrointestinal:  Positive for constipation. Negative for abdominal pain, nausea and vomiting.   Musculoskeletal:  Negative for arthralgias and myalgias.   Skin:  Negative for rash.   Neurological:  Negative for dizziness and weakness.     Objective:     Vital Signs (Most Recent):  Temp: 98.7 °F (37.1 °C) (12/31/22 1106)  Pulse: 66 (12/31/22 1106)  Resp: 18 (12/31/22 1106)  BP: 127/60 (12/31/22 1106)  SpO2: 97 % (12/31/22 1106) Vital Signs (24h Range):  Temp:  [98.2 °F (36.8 °C)-99 °F (37.2 °C)] 98.7 °F (37.1 °C)  Pulse:  [58-76] 66  Resp:  [18-22] 18  SpO2:  [96 %-98 %] 97 %  BP: ()/(55-70) 127/60     Weight: 51.7 kg (113 lb 15.7 oz)  Body mass index is 30.81 kg/m².    Intake/Output Summary (Last 24 hours) at 12/31/2022 1118  Last data filed at 12/31/2022 0983  Gross per 24 hour   Intake 1320 ml   Output 5700 ml   Net -4380 ml      Physical  Exam  Constitutional:       Appearance: Normal appearance.   HENT:      Head: Normocephalic and atraumatic.   Cardiovascular:      Rate and Rhythm: Normal rate and regular rhythm.      Pulses: Normal pulses.      Heart sounds: Murmur heard.      Comments: 3/5 sys murmur    Pulmonary:      Effort: Pulmonary effort is normal.      Breath sounds: Normal breath sounds.   Abdominal:      General: Abdomen is flat. There is no distension.      Palpations: Abdomen is soft.      Tenderness: There is no abdominal tenderness.      Comments: Stool burden evident by palp   Genitourinary:     Comments: Suprapubic catheter in place   No evidence of infection  Musculoskeletal:      Right lower leg: No edema.      Left lower leg: No edema.   Neurological:      Mental Status: He is alert and oriented to person, place, and time.   Psychiatric:         Mood and Affect: Mood normal.         Behavior: Behavior normal.         Thought Content: Thought content normal.         Judgment: Judgment normal.       Significant Labs: All pertinent labs within the past 24 hours have been reviewed.  CBC:   Recent Labs   Lab 12/29/22  1629 12/30/22  0510 12/31/22  0707   WBC  --  11.23 6.58   HGB  --  12.3* 10.4*   HCT 45 39.4* 32.8*   PLT  --  275 231     CMP:   Recent Labs   Lab 12/30/22  0510 12/31/22  0707    139   K 4.0 3.0*   CL 93* 90*   CO2 29 35*   GLU 71 72   BUN 66* 38*   CREATININE 1.0 0.6   CALCIUM 8.6* 8.1*   PROT 6.8 5.6*   ALBUMIN 3.3* 2.8*   BILITOT 0.3 0.5   ALKPHOS 79 63   AST 30 23   ALT 19 16   ANIONGAP 20* 14       Significant Imaging: I have reviewed all pertinent imaging results/findings within the past 24 hours.      Assessment/Plan:      * GI bleed  2 days of  Constant coffee-ground/blood-streaked emesis  Hgb 13.9   Denies blood per rectum  Significant fecal load  T+C drawn    PLAN:  Diet as tolerated   Zofran prn  Protonix 40mg IV bid  2 large bore IVs  GI consult, f/u recs  Maintain T+C    Constipation  States last  BM was 3 days before admit  Distended abd, mildly tender  Denies diarrhea, blood in stool  Pos bowel sounds  CT: 1. Large colonic and rectal stool burden resulting suggesting constipation, with rectal impaction not excluded.  Convincing evidence for associated small bowel obstruction or stercoral proctocolitis at this time.  2. Marked distension of the stomach to the level of the duodenal C-loop which remains patent, likely related to mass effect from stool-filled and distended right hepatic flexure large bowel loops with concern for least partial/developing gastric outlet obstruction.  Correlation advised and further evaluation/follow-up as warranted.  Surgical consultation may be warranted.  3. Elevated left hemidiaphragm presumably related to mass effect from marked gastric distension, with resultant left lower lobe atelectasis volume loss and leftward shift of the heart and lower mediastinum.  Bibasilar dependent atelectasis with scattered air bronchograms, for which superimposed aspiration or pneumonia is not excluded.  Had BM before enema (enema not performed)  Surgery not recommending procedure     PLAN:  Miralax, dulcolax, senna  Soap suds enema tid  NG decompression  Surgical, GI consults, f/u recs      UTI (urinary tract infection)  Permanent suprapubic catheter  Hx of UTIs  UA w/PRO,  Occult blood +2, Arnol +3, deann on micro     PLAN:  Continue 1g rocephin qd (from ED, started on 12/29)  F/u cultures (GNR found on 12/31)    Vomiting        Partial gastric outlet obstruction        HTN (hypertension)  Continue home  Lasix  20mg qd  Diltiazem 60mg bid    Holding losartan 5mg (normotensive)    Muscle spasms of both lower extremities  Continue home   Baclofen 20mg qhs  Gabapentin 600mg tid  ropinirole 4mg qhs      VTE Risk Mitigation (From admission, onward)         Ordered     IP VTE HIGH RISK PATIENT  Once         12/29/22 1537     Place sequential compression device  Until discontinued         12/29/22 6967                 Discharge Planning   OLIVER:      Code Status: Full Code   Is the patient medically ready for discharge?:     Reason for patient still in hospital (select all that apply): Patient trending condition  Discharge Plan A: Return to nursing home                  Robi Sotelo MD  Department of Hospital Medicine   [facility]

## 2022-12-31 NOTE — ASSESSMENT & PLAN NOTE
States last BM was 3 days before admit  Distended abd, mildly tender  Denies diarrhea, blood in stool  Pos bowel sounds  CT: 1. Large colonic and rectal stool burden resulting suggesting constipation, with rectal impaction not excluded.  Convincing evidence for associated small bowel obstruction or stercoral proctocolitis at this time.  2. Marked distension of the stomach to the level of the duodenal C-loop which remains patent, likely related to mass effect from stool-filled and distended right hepatic flexure large bowel loops with concern for least partial/developing gastric outlet obstruction.  Correlation advised and further evaluation/follow-up as warranted.  Surgical consultation may be warranted.  3. Elevated left hemidiaphragm presumably related to mass effect from marked gastric distension, with resultant left lower lobe atelectasis volume loss and leftward shift of the heart and lower mediastinum.  Bibasilar dependent atelectasis with scattered air bronchograms, for which superimposed aspiration or pneumonia is not excluded.  Had BM before enema (enema not performed)  Surgery not recommending procedure     PLAN:  Miralax, dulcolax, senna  Soap suds enema tid  NG decompression  Surgical, GI consults, f/u recs

## 2022-12-31 NOTE — CONSULTS
"[facility]    CC: GI bleed     HPI: This is 61 y/o male hx dwarfism, resident of [facility] nursing here for abd distention, and vomiting with coffee grounds.  GI consulted for gi bleed  Pt states past 3 or so days progressive , worsening abd distention, upper abd , worse than before, has had a couple similar episodes in past but not this bad, has had constipation in past, developed vomiting today that turned dark black with some red streaks. Last BM 3 days prior.   He is not sure if he has had prior endoscopy     Ct showing marked constipation with fecal load, as well as colonic interposition creating what seems to be mass effect on the duodenal C loop causing partial gastric outlet / upper GI obstruction.    Interval history:  Abdomen is less distended and feeling better from enemas. Passing gas. He is tolerating liquids, but NG tube is sucking out any ingested liquids. No overt GI blood loss reported.    Review of Systems:  Gastrointestinal ROS: Abdominal pain still present, but improved    Physical Examination  /60   Pulse 67   Temp 98.6 °F (37 °C)   Resp 20   Ht 4' 3" (1.295 m)   Wt 51.7 kg (113 lb 15.7 oz)   SpO2 98%   BMI 30.81 kg/m²   General appearance: alert, cooperative, no distress  HENT: Normocephalic, atraumatic, neck symmetrical, no nasal discharge   Lungs: clear to auscultation bilaterally, no dullness to percussion bilaterally  Heart: regular rate and rhythm without rub; no displacement of the PMI   Abdomen: soft, mild tenderness; bowel sounds normoactive; no organomegaly  Extremities: extremities symmetric; no clubbing, cyanosis, or edema  Neurologic: Alert and oriented X 3, normal strength    Assessment:   62 year old male with:    GI bleed  Hemoglobin trending down, but no overt GI bleeding reported     Needs continued decompression prior to any endosocpy    Plan:  PPI IV BID   Monitor hemoglobin and transfuse as appropriate   Advance diet as tolerated   Continue further " laxatives and enemas and bowel decompression prior to EGD    Will consider endoscopy pending clinical course, likely after holiday weekend.       Patient will be discussed and evaluated by staff physician. Please see their attestation for any changes to the current assessment or  plan.     Hi Tucker MD  [facility], PGY- IV  Pager: [phone]

## 2022-12-31 NOTE — PLAN OF CARE
Pts safety maintained, bed alarm on, call bell in reach. Meds given per MAR, fluids infusing. Ng tube to LCWS, puting out large amount of fluid, as pt is drinking a lot of water.Soap suds enema given. Suprapubic catheter leaking at the site, flushed with sterile NS per pt request. No N/V, SOB, distress during night. Vitals stable.

## 2022-12-31 NOTE — ASSESSMENT & PLAN NOTE
2 days of  Constant coffee-ground/blood-streaked emesis  Hgb 13.9   Denies blood per rectum  Significant fecal load  T+C drawn    PLAN:  Diet as tolerated   Zofran prn  Protonix 40mg IV bid  2 large bore IVs  GI consult, f/u recs  Maintain T+C

## 2022-12-31 NOTE — RESIDENT HANDOFF
Handoff     Primary Team: [facility]  HOSPITALIST TEAM Room Number: K516/[id number] A     Patient Name: Shaw Heredia MRN: [id number]     Date of Birth: [id number] Allergies: Patient has no known allergies.     Age: 62 y.o. Admit Date: 12/29/2022     Sex: male  BMI: Body mass index is 30.81 kg/m².     Code Status: Full Code        Illness Level (current clinical status): Watcher - No    Reason for Admission: GI bleed    Brief HPI (pertinent PMH and diagnosis or differential diagnosis): 61 y/o male w/pmhx: dwarfism, neurogenic bladder with suprapubic catheter, paraplegia (uses wheelchair), restless leg, muscle spasms, HTN, ileus, former etoh user, smoker,  uses home O2 (2lpm.) Admitted for HEMATEMESIS, SEVERE CONSTIPATION, UTI. Patient is resident of [facility], had two days of poor PO intake with hematemesis. Stated last BM was 3 days prior, but had massive stool burden on phys exam/CT. Incidental infectious UA (has hx of UTIs), but no complaints.    Procedure Date: possibly 1/3 EGD    Hospital Course (updated, brief assessment by system or problem, significant events): On rocephin. Cx shows G- rods, awaiting sensitvities.  Per GI, patient has aggressive bowel regimen with miralax, senna, docusate, soap suds enemas tid. He is having several large stools per day. NG tube for decompression (draining black liquid). Surgery is not concerned for obstruction. GI will EGD (possibly Tue) once stool burden eased to look for source of bleed. On PPI bid. Hgb trended from 12.3 to 10.4, susp dilutional. Patient denies nausea, further bleeds. Consented, T+C current. Lasix held in setting of hypokalemia, normotensive.    Tasks (specific, using if-then statements): advance diet, NPO for procedure    Contingency Plan (special circumstances anticipated and plan): transfuse prn    Estimated Discharge Date: 1/3-1/4    Discharge Disposition: Hospice/Medical Facility

## 2022-12-31 NOTE — SUBJECTIVE & OBJECTIVE
Interval History:   NAEON. VSSAF RA.  No BM overnight. Black liquid from NG tube, yellow urine from catheter. Denies further bleeding/vomiting. GI planning EGD after weekend/bowel regimen. Advancing diet as tolerated.    Review of Systems   Constitutional:  Positive for appetite change. Negative for fever.   Respiratory:  Negative for cough and shortness of breath.    Cardiovascular:  Negative for chest pain, palpitations and leg swelling.   Gastrointestinal:  Positive for constipation. Negative for abdominal pain, nausea and vomiting.   Musculoskeletal:  Negative for arthralgias and myalgias.   Skin:  Negative for rash.   Neurological:  Negative for dizziness and weakness.     Objective:     Vital Signs (Most Recent):  Temp: 98.7 °F (37.1 °C) (12/31/22 1106)  Pulse: 66 (12/31/22 1106)  Resp: 18 (12/31/22 1106)  BP: 127/60 (12/31/22 1106)  SpO2: 97 % (12/31/22 1106) Vital Signs (24h Range):  Temp:  [98.2 °F (36.8 °C)-99 °F (37.2 °C)] 98.7 °F (37.1 °C)  Pulse:  [58-76] 66  Resp:  [18-22] 18  SpO2:  [96 %-98 %] 97 %  BP: ()/(55-70) 127/60     Weight: 51.7 kg (113 lb 15.7 oz)  Body mass index is 30.81 kg/m².    Intake/Output Summary (Last 24 hours) at 12/31/2022 1113  Last data filed at 12/31/2022 0925  Gross per 24 hour   Intake 1320 ml   Output 5700 ml   Net -4380 ml      Physical Exam  Constitutional:       Appearance: Normal appearance.   HENT:      Head: Normocephalic and atraumatic.   Cardiovascular:      Rate and Rhythm: Normal rate and regular rhythm.      Pulses: Normal pulses.      Heart sounds: Murmur heard.      Comments: 3/5 sys murmur    Pulmonary:      Effort: Pulmonary effort is normal.      Breath sounds: Normal breath sounds.   Abdominal:      General: Abdomen is flat. There is no distension.      Palpations: Abdomen is soft.      Tenderness: There is no abdominal tenderness.      Comments: Stool burden evident by palp   Genitourinary:     Comments: Suprapubic catheter in place   No evidence of  infection  Musculoskeletal:      Right lower leg: No edema.      Left lower leg: No edema.   Neurological:      Mental Status: He is alert and oriented to person, place, and time.   Psychiatric:         Mood and Affect: Mood normal.         Behavior: Behavior normal.         Thought Content: Thought content normal.         Judgment: Judgment normal.       Significant Labs: All pertinent labs within the past 24 hours have been reviewed.  CBC:   Recent Labs   Lab 12/29/22  1629 12/30/22  0510 12/31/22  0707   WBC  --  11.23 6.58   HGB  --  12.3* 10.4*   HCT 45 39.4* 32.8*   PLT  --  275 231     CMP:   Recent Labs   Lab 12/30/22  0510 12/31/22  0707    139   K 4.0 3.0*   CL 93* 90*   CO2 29 35*   GLU 71 72   BUN 66* 38*   CREATININE 1.0 0.6   CALCIUM 8.6* 8.1*   PROT 6.8 5.6*   ALBUMIN 3.3* 2.8*   BILITOT 0.3 0.5   ALKPHOS 79 63   AST 30 23   ALT 19 16   ANIONGAP 20* 14       Significant Imaging: I have reviewed all pertinent imaging results/findings within the past 24 hours.

## 2022-12-31 NOTE — ASSESSMENT & PLAN NOTE
Permanent suprapubic catheter  Hx of UTIs  UA w/PRO,  Occult blood +2, Arnol +3, deann on micro     PLAN:  Continue 1g rocephin qd (from ED, started on 12/29)  F/u cultures (GNR found on 12/31)

## 2023-01-01 LAB
ALBUMIN SERPL BCP-MCNC: 3 G/DL (ref 3.5–5.2)
ALP SERPL-CCNC: 68 U/L (ref 55–135)
ALT SERPL W/O P-5'-P-CCNC: 18 U/L (ref 10–44)
ANION GAP SERPL CALC-SCNC: 21 MMOL/L (ref 8–16)
ANION GAP SERPL CALC-SCNC: 25 MMOL/L (ref 8–16)
AST SERPL-CCNC: 25 U/L (ref 10–40)
BACTERIA UR CULT: ABNORMAL
BACTERIA UR CULT: ABNORMAL
BASOPHILS # BLD AUTO: 0.01 K/UL (ref 0–0.2)
BASOPHILS NFR BLD: 0.1 % (ref 0–1.9)
BILIRUB SERPL-MCNC: 0.5 MG/DL (ref 0.1–1)
BUN SERPL-MCNC: 17 MG/DL (ref 6–20)
BUN SERPL-MCNC: 25 MG/DL (ref 6–20)
CALCIUM SERPL-MCNC: 8.4 MG/DL (ref 8.7–10.5)
CALCIUM SERPL-MCNC: 8.4 MG/DL (ref 8.7–10.5)
CHLORIDE SERPL-SCNC: 89 MMOL/L (ref 95–110)
CHLORIDE SERPL-SCNC: 90 MMOL/L (ref 95–110)
CO2 SERPL-SCNC: 25 MMOL/L (ref 23–29)
CO2 SERPL-SCNC: 26 MMOL/L (ref 23–29)
CREAT SERPL-MCNC: 0.6 MG/DL (ref 0.5–1.4)
CREAT SERPL-MCNC: 0.7 MG/DL (ref 0.5–1.4)
DIFFERENTIAL METHOD: ABNORMAL
EOSINOPHIL # BLD AUTO: 0 K/UL (ref 0–0.5)
EOSINOPHIL NFR BLD: 0.4 % (ref 0–8)
ERYTHROCYTE [DISTWIDTH] IN BLOOD BY AUTOMATED COUNT: 12.6 % (ref 11.5–14.5)
EST. GFR  (NO RACE VARIABLE): >60 ML/MIN/1.73 M^2
EST. GFR  (NO RACE VARIABLE): >60 ML/MIN/1.73 M^2
GLUCOSE SERPL-MCNC: 62 MG/DL (ref 70–110)
GLUCOSE SERPL-MCNC: 86 MG/DL (ref 70–110)
HCT VFR BLD AUTO: 35.7 % (ref 40–54)
HGB BLD-MCNC: 11.1 G/DL (ref 14–18)
IMM GRANULOCYTES # BLD AUTO: 0.03 K/UL (ref 0–0.04)
IMM GRANULOCYTES NFR BLD AUTO: 0.4 % (ref 0–0.5)
LYMPHOCYTES # BLD AUTO: 1.8 K/UL (ref 1–4.8)
LYMPHOCYTES NFR BLD: 24.1 % (ref 18–48)
MAGNESIUM SERPL-MCNC: 2 MG/DL (ref 1.6–2.6)
MCH RBC QN AUTO: 28 PG (ref 27–31)
MCHC RBC AUTO-ENTMCNC: 31.1 G/DL (ref 32–36)
MCV RBC AUTO: 90 FL (ref 82–98)
MONOCYTES # BLD AUTO: 0.5 K/UL (ref 0.3–1)
MONOCYTES NFR BLD: 6.8 % (ref 4–15)
NEUTROPHILS # BLD AUTO: 5 K/UL (ref 1.8–7.7)
NEUTROPHILS NFR BLD: 68.2 % (ref 38–73)
NRBC BLD-RTO: 0 /100 WBC
PHOSPHATE SERPL-MCNC: 2.2 MG/DL (ref 2.7–4.5)
PLATELET # BLD AUTO: 217 K/UL (ref 150–450)
PMV BLD AUTO: 9.7 FL (ref 9.2–12.9)
POTASSIUM SERPL-SCNC: 2.6 MMOL/L (ref 3.5–5.1)
POTASSIUM SERPL-SCNC: 3.7 MMOL/L (ref 3.5–5.1)
PROT SERPL-MCNC: 6.1 G/DL (ref 6–8.4)
RBC # BLD AUTO: 3.96 M/UL (ref 4.6–6.2)
SODIUM SERPL-SCNC: 137 MMOL/L (ref 136–145)
SODIUM SERPL-SCNC: 139 MMOL/L (ref 136–145)
WBC # BLD AUTO: 7.39 K/UL (ref 3.9–12.7)

## 2023-01-01 PROCEDURE — 25000003 PHARM REV CODE 250

## 2023-01-01 PROCEDURE — 80048 BASIC METABOLIC PNL TOTAL CA: CPT

## 2023-01-01 PROCEDURE — 63600175 PHARM REV CODE 636 W HCPCS

## 2023-01-01 PROCEDURE — 27000221 HC OXYGEN, UP TO 24 HOURS

## 2023-01-01 PROCEDURE — 93010 EKG 12-LEAD: ICD-10-PCS | Mod: ,,, | Performed by: INTERNAL MEDICINE

## 2023-01-01 PROCEDURE — 80053 COMPREHEN METABOLIC PANEL: CPT

## 2023-01-01 PROCEDURE — C9113 INJ PANTOPRAZOLE SODIUM, VIA: HCPCS

## 2023-01-01 PROCEDURE — 63600175 PHARM REV CODE 636 W HCPCS: Performed by: STUDENT IN AN ORGANIZED HEALTH CARE EDUCATION/TRAINING PROGRAM

## 2023-01-01 PROCEDURE — 36415 COLL VENOUS BLD VENIPUNCTURE: CPT

## 2023-01-01 PROCEDURE — 93010 ELECTROCARDIOGRAM REPORT: CPT | Mod: ,,, | Performed by: INTERNAL MEDICINE

## 2023-01-01 PROCEDURE — 93005 ELECTROCARDIOGRAM TRACING: CPT

## 2023-01-01 PROCEDURE — 83735 ASSAY OF MAGNESIUM: CPT

## 2023-01-01 PROCEDURE — 94761 N-INVAS EAR/PLS OXIMETRY MLT: CPT

## 2023-01-01 PROCEDURE — 25000003 PHARM REV CODE 250: Performed by: STUDENT IN AN ORGANIZED HEALTH CARE EDUCATION/TRAINING PROGRAM

## 2023-01-01 PROCEDURE — 21400001 HC TELEMETRY ROOM

## 2023-01-01 PROCEDURE — 85025 COMPLETE CBC W/AUTO DIFF WBC: CPT

## 2023-01-01 PROCEDURE — 84100 ASSAY OF PHOSPHORUS: CPT

## 2023-01-01 PROCEDURE — 99900035 HC TECH TIME PER 15 MIN (STAT)

## 2023-01-01 RX ORDER — POTASSIUM CHLORIDE 20 MEQ/1
40 TABLET, EXTENDED RELEASE ORAL
Status: COMPLETED | OUTPATIENT
Start: 2023-01-01 | End: 2023-01-01

## 2023-01-01 RX ORDER — NITROFURANTOIN 25; 75 MG/1; MG/1
100 CAPSULE ORAL EVERY 12 HOURS
Status: DISCONTINUED | OUTPATIENT
Start: 2023-01-01 | End: 2023-01-03

## 2023-01-01 RX ORDER — SODIUM,POTASSIUM PHOSPHATES 280-250MG
1 POWDER IN PACKET (EA) ORAL EVERY 6 HOURS
Status: COMPLETED | OUTPATIENT
Start: 2023-01-01 | End: 2023-01-01

## 2023-01-01 RX ORDER — SODIUM,POTASSIUM PHOSPHATES 280-250MG
1 POWDER IN PACKET (EA) ORAL ONCE
Status: DISCONTINUED | OUTPATIENT
Start: 2023-01-01 | End: 2023-01-03 | Stop reason: HOSPADM

## 2023-01-01 RX ADMIN — Medication 6 MG: at 08:01

## 2023-01-01 RX ADMIN — PANTOPRAZOLE SODIUM 40 MG: 40 INJECTION, POWDER, FOR SOLUTION INTRAVENOUS at 08:01

## 2023-01-01 RX ADMIN — Medication 1 TABLET: at 09:01

## 2023-01-01 RX ADMIN — LACTULOSE 15 G: 10 SOLUTION ORAL at 09:01

## 2023-01-01 RX ADMIN — DILTIAZEM HYDROCHLORIDE 60 MG: 30 TABLET, FILM COATED ORAL at 08:01

## 2023-01-01 RX ADMIN — POTASSIUM CHLORIDE 40 MEQ: 1500 TABLET, EXTENDED RELEASE ORAL at 09:01

## 2023-01-01 RX ADMIN — TAMSULOSIN HYDROCHLORIDE 0.4 MG: 0.4 CAPSULE ORAL at 08:01

## 2023-01-01 RX ADMIN — DOCUSATE SODIUM 100 MG: 100 CAPSULE, LIQUID FILLED ORAL at 09:01

## 2023-01-01 RX ADMIN — POLYETHYLENE GLYCOL 3350 17 G: 17 POWDER, FOR SOLUTION ORAL at 09:01

## 2023-01-01 RX ADMIN — NITROFURANTOIN (MONOHYDRATE/MACROCRYSTALS) 100 MG: 75; 25 CAPSULE ORAL at 12:01

## 2023-01-01 RX ADMIN — DOCUSATE SODIUM 100 MG: 100 CAPSULE, LIQUID FILLED ORAL at 08:01

## 2023-01-01 RX ADMIN — MAGNESIUM OXIDE TAB 400 MG (241.3 MG ELEMENTAL MG) 400 MG: 400 (241.3 MG) TAB at 09:01

## 2023-01-01 RX ADMIN — POTASSIUM CHLORIDE 40 MEQ: 1500 TABLET, EXTENDED RELEASE ORAL at 10:01

## 2023-01-01 RX ADMIN — GABAPENTIN 600 MG: 300 CAPSULE ORAL at 02:01

## 2023-01-01 RX ADMIN — GABAPENTIN 600 MG: 300 CAPSULE ORAL at 08:01

## 2023-01-01 RX ADMIN — ROPINIROLE HYDROCHLORIDE 4 MG: 1 TABLET, FILM COATED ORAL at 08:01

## 2023-01-01 RX ADMIN — NITROFURANTOIN (MONOHYDRATE/MACROCRYSTALS) 100 MG: 75; 25 CAPSULE ORAL at 08:01

## 2023-01-01 RX ADMIN — POTASSIUM CHLORIDE 40 MEQ: 1500 TABLET, EXTENDED RELEASE ORAL at 07:01

## 2023-01-01 RX ADMIN — LACTULOSE 15 G: 10 SOLUTION ORAL at 02:01

## 2023-01-01 RX ADMIN — POTASSIUM & SODIUM PHOSPHATES POWDER PACK 280-160-250 MG 1 PACKET: 280-160-250 PACK at 06:01

## 2023-01-01 RX ADMIN — CEFTRIAXONE 1 G: 1 INJECTION, SOLUTION INTRAVENOUS at 02:01

## 2023-01-01 RX ADMIN — DILTIAZEM HYDROCHLORIDE 60 MG: 30 TABLET, FILM COATED ORAL at 09:01

## 2023-01-01 RX ADMIN — SENNOSIDES 8.6 MG: 8.6 TABLET, FILM COATED ORAL at 09:01

## 2023-01-01 RX ADMIN — PANTOPRAZOLE SODIUM 40 MG: 40 INJECTION, POWDER, FOR SOLUTION INTRAVENOUS at 09:01

## 2023-01-01 RX ADMIN — GABAPENTIN 600 MG: 300 CAPSULE ORAL at 09:01

## 2023-01-01 RX ADMIN — LACTULOSE 15 G: 10 SOLUTION ORAL at 08:01

## 2023-01-01 RX ADMIN — BACLOFEN 20 MG: 10 TABLET ORAL at 08:01

## 2023-01-01 RX ADMIN — POTASSIUM & SODIUM PHOSPHATES POWDER PACK 280-160-250 MG 1 PACKET: 280-160-250 PACK at 12:01

## 2023-01-01 NOTE — NURSING
Notified Dr Villatoro that pt has moderate amounts of drainage from insertion site of suprapubic catheter and that dressing has been soaked through 3x this shift. Dr. Villatoro advised to monitor and change dressing PRN until urology could consult.

## 2023-01-01 NOTE — PROGRESS NOTES
[facility] Medicine  Progress Note    Patient Name: Shaw Heredia  MRN: [id number]  Patient Class: OP- Observation   Admission Date: 12/29/2022  Length of Stay: 0 days  Attending Physician: Marychuy Brand MD  Primary Care Provider: Primary Doctor No        Subjective:     Principal Problem:GI bleed        HPI:  61 y/o male from [facility], pmhx: dwarfism, neurogenic bladder with suprapubic catheter, paraplegia (uses wheelchair), restless leg, muscle spasms, HTN, ileus, former etoh user, smoker,  uses home O2 (2lpm).    C/o abdominal bloating for the past 4 days along with coffee-ground and red-streaked emesis for two days (upwards of 10x). Denies bloody/dark stool, GERD, fever, chills, diarrhea,  chest pain, SOB. States last BM was 3 days ago.      In Emergency, 102/69, , 99.1F, SpO2  96% 3lpm. Labs were significant for WBC 15.54, Hgb 13.9 (2mo ago 11.5), Cl- 86, HCO3- 39, BUN 60, Cr wnl, anion gap 19, UA PRO +2, Occult blood +2, Leuks +3, many deann on micro. Trip,  lipase , INR, wnl. CXR showed evidence of distended abd. CT showed Large colonic and rectal stool burden resulting suggesting constipation, with rectal impaction not excluded. Convincing evidence for associated small bowel obstruction or stercoral proctocolitis at this time. Marked distension of the stomach to the level of the duodenal C-loop which remains patent, likely related to mass effect from stool-filled and distended right hepatic flexure large bowel loops with concern for least partial/developing gastric outlet obstruction. He was started on Rocephin 1g, IV protonix. GI and Surgery were consulted.  He was admitted for UTI, susp GI bleed and susp fecal impaction.         Overview/Hospital Course:  61 y/o male admitted for GI bleed (hematemesis), constipation with large stool burden and UTI. The patient felt well on arrival to our service with no weakness, nausea or vomiting. He was given miralax, and colace (no  enema) and had a large bowel movement in the evening. He was put on IV rocephin for the UTI. Surgery saw the patient and did not diagnose a bowel obstruction. They advised conservative management and GI consult. GI suggested IV protonix and NG stomach decompression. They stated they would pass EGD after stool burden was relieved. They recommended tid enemas. The patient's Hbg was stable overnight. He was given his first enema on 12/30 with a large amount of stool relieved. A second enema later on 12/30 was also effective, but to a lesser degree. A significant stool burden was suspected on repeat physical exam. His NG tube was still draining dark liquid, but had been on continuous setting and was corrected. An FOBT was negative. He denies new bleeding.        Interval History: CAMPOSEON, Patient reports subjective palpitations this morning    Review of Systems   Constitutional:  Positive for appetite change. Negative for fever.   Respiratory:  Negative for cough and shortness of breath.    Cardiovascular:  Negative for chest pain, palpitations and leg swelling.   Gastrointestinal:  Positive for constipation. Negative for abdominal pain, nausea and vomiting.   Musculoskeletal:  Negative for arthralgias and myalgias.   Skin:  Negative for rash.   Neurological:  Negative for dizziness and weakness.   Objective:     Vital Signs (Most Recent):  Temp: 98.2 °F (36.8 °C) (01/01/23 0814)  Pulse: 69 (01/01/23 0824)  Resp: 18 (01/01/23 0814)  BP: (!) 147/72 (01/01/23 0824)  SpO2: 97 % (01/01/23 0814)   Vital Signs (24h Range):  Temp:  [97.9 °F (36.6 °C)-99.6 °F (37.6 °C)] 98.2 °F (36.8 °C)  Pulse:  [63-71] 69  Resp:  [18-20] 18  SpO2:  [95 %-97 %] 97 %  BP: (127-177)/(60-83) 147/72     Weight: 55.5 kg (122 lb 5.7 oz)  Body mass index is 33.07 kg/m².    Intake/Output Summary (Last 24 hours) at 1/1/2023 0846  Last data filed at 12/31/2022 1925  Gross per 24 hour   Intake 1159.56 ml   Output 3150 ml   Net -1990.44 ml      Physical  Exam  Constitutional:       Appearance: Normal appearance.   HENT:      Head: Normocephalic and atraumatic.   Cardiovascular:      Rate and Rhythm: Normal rate and regular rhythm.      Pulses: Normal pulses.      Heart sounds: Murmur heard.      Comments: 3/5 sys murmur    Pulmonary:      Effort: Pulmonary effort is normal.      Breath sounds: Normal breath sounds.   Abdominal:      General: Abdomen is flat. There is no distension.      Palpations: Abdomen is soft.      Tenderness: There is no abdominal tenderness.      Comments: Abdomen distention improved   Genitourinary:     Comments: Suprapubic catheter in place   No evidence of infection  Musculoskeletal:      Right lower leg: No edema.      Left lower leg: No edema.   Neurological:      Mental Status: He is alert and oriented to person, place, and time.   Psychiatric:         Mood and Affect: Mood normal.         Behavior: Behavior normal.         Thought Content: Thought content normal.         Judgment: Judgment normal.       Significant Labs: All pertinent labs within the past 24 hours have been reviewed.  Blood Culture:   Recent Labs   Lab 12/31/22  0707   LABBLOO No Growth to date  No Growth to date     BMP:   Recent Labs   Lab 01/01/23  0533   GLU 62*      K 2.6*   CL 89*   CO2 25   BUN 25*   CREATININE 0.6   CALCIUM 8.4*   MG 2.0     CBC:   Recent Labs   Lab 12/31/22  0707 01/01/23  0534   WBC 6.58 7.39   HGB 10.4* 11.1*   HCT 32.8* 35.7*    217     CMP:   Recent Labs   Lab 12/31/22  0707 01/01/23  0533    139   K 3.0* 2.6*   CL 90* 89*   CO2 35* 25   GLU 72 62*   BUN 38* 25*   CREATININE 0.6 0.6   CALCIUM 8.1* 8.4*   PROT 5.6* 6.1   ALBUMIN 2.8* 3.0*   BILITOT 0.5 0.5   ALKPHOS 63 68   AST 23 25   ALT 16 18   ANIONGAP 14 25*       Significant Imaging: I have reviewed all pertinent imaging results/findings within the past 24 hours.      Assessment/Plan:      * GI bleed  2 days of  Constant coffee-ground/blood-streaked emesis  Hgb  13.9   Denies blood per rectum  Significant fecal load  T+C drawn  Stool burden significantly improved    PLAN:  Full Liquid diet  NG set to intermittent suction  Zofran prn  Protonix 40mg IV bid  EGD possibly tuesday  2 large bore IVs  GI consult, f/u recs  Maintain T+C    Vomiting  No vomiting since placement of NG  NG output 2600CC on 12/31/22    Plan  Monitor NG output  NG decompression    Partial gastric outlet obstruction  Obstructive pain suspected 2/2 to stool burden  Plan  Advance diet as tolerated  Repeat Imaging, Xray to assess bowel distention  Monitor stool output  Reassess need for TID enema      HTN (hypertension)  Continue home  Lasix  20mg qd  Diltiazem 60mg bid    Holding losartan 5mg (normotensive)    Muscle spasms of both lower extremities  Continue home regimen   Baclofen 20mg qhs  Gabapentin 600mg tid  ropinirole 4mg qhs    UTI (urinary tract infection)  Permanent suprapubic catheter  Hx of UTIs  UA w/PRO,  Occult blood +2, Arnol +3, deann on micro   Cx Proteus and ESBL    PLAN:  Suprapubic catheter needs to be replaced  Urology consult if necessary  Continue 1g rocephin qd (from ED, started on 12/29)  ESBL shows roecephin resistance, consider addition of Nitrofurantoin  F/u cultures (GNR found on 12/31)    Constipation  States last BM was 3 days before admit  Distended abd, mildly tender  Denies diarrhea, blood in stool  Pos bowel sounds  CT scan showed significant bowel distension 2/2 to stool burden  Had BM before enema   Enemas producing minimal liquid stool following day of profuse stooling  Surgery not recommending procedure   Stool burden significantly improved    PLAN:  Miralax, dulcolax, senna  Soap suds enema tid  NG decompression  Surgical, GI consults, f/u recs          VTE Risk Mitigation (From admission, onward)         Ordered     IP VTE HIGH RISK PATIENT  Once         12/29/22 1537     Place sequential compression device  Until discontinued         12/29/22 1537                 Discharge Planning   OLIVER:      Code Status: Full Code   Is the patient medically ready for discharge?:     Reason for patient still in hospital (select all that apply): Patient trending condition, Laboratory test, Treatment, Imaging and Consult recommendations  Discharge Plan A: Return to nursing home                Nghia Guzman MD  [facility] PGY-2  01/01/2023

## 2023-01-01 NOTE — ASSESSMENT & PLAN NOTE
2 days of  Constant coffee-ground/blood-streaked emesis  Hgb 13.9   Denies blood per rectum  Significant fecal load  T+C drawn  Stool burden significantly improved    PLAN:  Full Liquid diet  NG set to intermittent suction  Zofran prn  Protonix 40mg IV bid  EGD possibly tuesday  2 large bore IVs  GI consult, f/u recs  Maintain T+C

## 2023-01-01 NOTE — SUBJECTIVE & OBJECTIVE
Interval History: NAEON, Patient reports subjective palpitations this morning    Review of Systems   Constitutional:  Positive for appetite change. Negative for fever.   Respiratory:  Negative for cough and shortness of breath.    Cardiovascular:  Negative for chest pain, palpitations and leg swelling.   Gastrointestinal:  Positive for constipation. Negative for abdominal pain, nausea and vomiting.   Musculoskeletal:  Negative for arthralgias and myalgias.   Skin:  Negative for rash.   Neurological:  Negative for dizziness and weakness.   Objective:     Vital Signs (Most Recent):  Temp: 98.2 °F (36.8 °C) (01/01/23 0814)  Pulse: 69 (01/01/23 0824)  Resp: 18 (01/01/23 0814)  BP: (!) 147/72 (01/01/23 0824)  SpO2: 97 % (01/01/23 0814)   Vital Signs (24h Range):  Temp:  [97.9 °F (36.6 °C)-99.6 °F (37.6 °C)] 98.2 °F (36.8 °C)  Pulse:  [63-71] 69  Resp:  [18-20] 18  SpO2:  [95 %-97 %] 97 %  BP: (127-177)/(60-83) 147/72     Weight: 55.5 kg (122 lb 5.7 oz)  Body mass index is 33.07 kg/m².    Intake/Output Summary (Last 24 hours) at 1/1/2023 0846  Last data filed at 12/31/2022 1925  Gross per 24 hour   Intake 1159.56 ml   Output 3150 ml   Net -1990.44 ml      Physical Exam  Constitutional:       Appearance: Normal appearance.   HENT:      Head: Normocephalic and atraumatic.   Cardiovascular:      Rate and Rhythm: Normal rate and regular rhythm.      Pulses: Normal pulses.      Heart sounds: Murmur heard.      Comments: 3/5 sys murmur    Pulmonary:      Effort: Pulmonary effort is normal.      Breath sounds: Normal breath sounds.   Abdominal:      General: Abdomen is flat. There is no distension.      Palpations: Abdomen is soft.      Tenderness: There is no abdominal tenderness.      Comments: Abdomen distention improved   Genitourinary:     Comments: Suprapubic catheter in place   No evidence of infection  Musculoskeletal:      Right lower leg: No edema.      Left lower leg: No edema.   Neurological:      Mental Status: He  is alert and oriented to person, place, and time.   Psychiatric:         Mood and Affect: Mood normal.         Behavior: Behavior normal.         Thought Content: Thought content normal.         Judgment: Judgment normal.       Significant Labs: All pertinent labs within the past 24 hours have been reviewed.  Blood Culture:   Recent Labs   Lab 12/31/22 0707   LABBLOO No Growth to date  No Growth to date     BMP:   Recent Labs   Lab 01/01/23  0533   GLU 62*      K 2.6*   CL 89*   CO2 25   BUN 25*   CREATININE 0.6   CALCIUM 8.4*   MG 2.0     CBC:   Recent Labs   Lab 12/31/22 0707 01/01/23  0534   WBC 6.58 7.39   HGB 10.4* 11.1*   HCT 32.8* 35.7*    217     CMP:   Recent Labs   Lab 12/31/22 0707 01/01/23  0533    139   K 3.0* 2.6*   CL 90* 89*   CO2 35* 25   GLU 72 62*   BUN 38* 25*   CREATININE 0.6 0.6   CALCIUM 8.1* 8.4*   PROT 5.6* 6.1   ALBUMIN 2.8* 3.0*   BILITOT 0.5 0.5   ALKPHOS 63 68   AST 23 25   ALT 16 18   ANIONGAP 14 25*       Significant Imaging: I have reviewed all pertinent imaging results/findings within the past 24 hours.

## 2023-01-01 NOTE — ASSESSMENT & PLAN NOTE
Obstructive pain suspected 2/2 to stool burden  Plan  Advance diet as tolerated  Repeat Imaging, Xray to assess bowel distention  Monitor stool output  Reassess need for TID enema

## 2023-01-01 NOTE — ASSESSMENT & PLAN NOTE
No vomiting since placement of NG  NG output 2600CC on 12/31/22    Plan  Monitor NG output  NG decompression

## 2023-01-01 NOTE — ASSESSMENT & PLAN NOTE
Permanent suprapubic catheter  Hx of UTIs  UA w/PRO,  Occult blood +2, Arnol +3, deann on micro   Cx Proteus and ESBL    PLAN:  Suprapubic catheter needs to be replaced  Urology consult if necessary  Continue 1g rocephin qd (from ED, started on 12/29)  ESBL shows roecephin resistance, consider addition of Nitrofurantoin  F/u cultures (GNR found on 12/31)

## 2023-01-01 NOTE — ASSESSMENT & PLAN NOTE
States last BM was 3 days before admit  Distended abd, mildly tender  Denies diarrhea, blood in stool  Pos bowel sounds  CT scan showed significant bowel distension 2/2 to stool burden  Had BM before enema   Enemas producing minimal liquid stool following day of profuse stooling  Surgery not recommending procedure   Stool burden significantly improved    PLAN:  Miralax, dulcolax, senna  Soap suds enema tid  NG decompression  Surgical, GI consults, f/u recs

## 2023-01-01 NOTE — PROGRESS NOTES
"[facility]    CC: GI bleed     HPI: This is 63 y/o male hx dwarfism, resident of [facility] nursing here for abd distention, and vomiting with coffee grounds.  GI consulted for gi bleed  Pt states past 3 or so days progressive , worsening abd distention, upper abd , worse than before, has had a couple similar episodes in past but not this bad, has had constipation in past, developed vomiting today that turned dark black with some red streaks. Last BM 3 days prior.   He is not sure if he has had prior endoscopy     Ct showing marked constipation with fecal load, as well as colonic interposition creating what seems to be mass effect on the duodenal C loop causing partial gastric outlet / upper GI obstruction.    Interval history:  Feeling well this morning though complains he can not sleep. No further episodes of overt GI blood loss. Is passing gas and having bowel movements.    Review of Systems:  Gastrointestinal ROS: Abdominal pain still present, but improved    Physical Examination  BP (!) 147/72   Pulse 69   Temp 98.2 °F (36.8 °C)   Resp 18   Ht 4' 3" (1.295 m)   Wt 55.5 kg (122 lb 5.7 oz)   SpO2 97%   BMI 33.07 kg/m²   General appearance: alert, cooperative, no distress  HENT: Normocephalic, atraumatic, neck symmetrical, no nasal discharge   Lungs: clear to auscultation bilaterally, no dullness to percussion bilaterally  Heart: regular rate and rhythm without rub; no displacement of the PMI   Abdomen: soft, mild tenderness; bowel sounds normoactive; no organomegaly  Extremities: extremities symmetric; no clubbing, cyanosis, or edema  Neurologic: Alert and oriented X 3, normal strength    Assessment:   62 year old male with:    GI bleeding- came in with anemia and hematemesis, but nothing since. Ddx includes PUD vs gastritis/esophagitis vs MWT. Hemoglobin stable and no further overt bleeding     2. Constipation- noted on imaging with large stool load. Planned decompression prior to endoscopy. " Improved with scheduled laxatives and enemas.      Plan:  -PPI IV BID   -Monitor hemoglobin and transfuse as appropriate   -Advance diet as tolerated   -Would clamp NG tube and if tolerating diet without n/v would remove   -Okay to stop daily enemas and trial of oral laxatives.   -EGD planned for Tuesday unless more emergent indication arises      Patient will be discussed and evaluated by staff physician. Please see their attestation for any changes to the current assessment or  plan.     Hi Tucker MD  [employer] [facility] Fellow, PGY- IV  Pager: [phone]

## 2023-01-02 LAB
ALBUMIN SERPL BCP-MCNC: 3 G/DL (ref 3.5–5.2)
ALP SERPL-CCNC: 64 U/L (ref 55–135)
ALT SERPL W/O P-5'-P-CCNC: 21 U/L (ref 10–44)
ANION GAP SERPL CALC-SCNC: 14 MMOL/L (ref 8–16)
AST SERPL-CCNC: 29 U/L (ref 10–40)
BACTERIA BLD CULT: ABNORMAL
BASOPHILS # BLD AUTO: 0 K/UL (ref 0–0.2)
BASOPHILS NFR BLD: 0 % (ref 0–1.9)
BILIRUB SERPL-MCNC: 0.6 MG/DL (ref 0.1–1)
BUN SERPL-MCNC: 9 MG/DL (ref 6–20)
CALCIUM SERPL-MCNC: 8.4 MG/DL (ref 8.7–10.5)
CHLORIDE SERPL-SCNC: 92 MMOL/L (ref 95–110)
CO2 SERPL-SCNC: 29 MMOL/L (ref 23–29)
CREAT SERPL-MCNC: 0.6 MG/DL (ref 0.5–1.4)
DIFFERENTIAL METHOD: ABNORMAL
EOSINOPHIL # BLD AUTO: 0.1 K/UL (ref 0–0.5)
EOSINOPHIL NFR BLD: 0.8 % (ref 0–8)
ERYTHROCYTE [DISTWIDTH] IN BLOOD BY AUTOMATED COUNT: 12.5 % (ref 11.5–14.5)
EST. GFR  (NO RACE VARIABLE): >60 ML/MIN/1.73 M^2
GLUCOSE SERPL-MCNC: 97 MG/DL (ref 70–110)
HCT VFR BLD AUTO: 35.6 % (ref 40–54)
HGB BLD-MCNC: 11.4 G/DL (ref 14–18)
IMM GRANULOCYTES # BLD AUTO: 0.04 K/UL (ref 0–0.04)
IMM GRANULOCYTES NFR BLD AUTO: 0.6 % (ref 0–0.5)
LYMPHOCYTES # BLD AUTO: 2 K/UL (ref 1–4.8)
LYMPHOCYTES NFR BLD: 27.5 % (ref 18–48)
MAGNESIUM SERPL-MCNC: 1.8 MG/DL (ref 1.6–2.6)
MCH RBC QN AUTO: 28.3 PG (ref 27–31)
MCHC RBC AUTO-ENTMCNC: 32 G/DL (ref 32–36)
MCV RBC AUTO: 88 FL (ref 82–98)
MONOCYTES # BLD AUTO: 0.4 K/UL (ref 0.3–1)
MONOCYTES NFR BLD: 5.6 % (ref 4–15)
NEUTROPHILS # BLD AUTO: 4.8 K/UL (ref 1.8–7.7)
NEUTROPHILS NFR BLD: 65.5 % (ref 38–73)
NRBC BLD-RTO: 0 /100 WBC
PHOSPHATE SERPL-MCNC: 1.1 MG/DL (ref 2.7–4.5)
PLATELET # BLD AUTO: 166 K/UL (ref 150–450)
PMV BLD AUTO: 10.8 FL (ref 9.2–12.9)
POTASSIUM SERPL-SCNC: 4.1 MMOL/L (ref 3.5–5.1)
PROT SERPL-MCNC: 6 G/DL (ref 6–8.4)
RBC # BLD AUTO: 4.03 M/UL (ref 4.6–6.2)
SODIUM SERPL-SCNC: 135 MMOL/L (ref 136–145)
WBC # BLD AUTO: 7.27 K/UL (ref 3.9–12.7)

## 2023-01-02 PROCEDURE — 51705 PR CHANGE OF BLADDER TUBE,SIMPLE: ICD-10-PCS | Mod: ,,, | Performed by: UROLOGY

## 2023-01-02 PROCEDURE — 25000003 PHARM REV CODE 250

## 2023-01-02 PROCEDURE — 27000221 HC OXYGEN, UP TO 24 HOURS

## 2023-01-02 PROCEDURE — 63600175 PHARM REV CODE 636 W HCPCS

## 2023-01-02 PROCEDURE — 36415 COLL VENOUS BLD VENIPUNCTURE: CPT

## 2023-01-02 PROCEDURE — 99222 1ST HOSP IP/OBS MODERATE 55: CPT | Mod: 25,,, | Performed by: UROLOGY

## 2023-01-02 PROCEDURE — 94761 N-INVAS EAR/PLS OXIMETRY MLT: CPT

## 2023-01-02 PROCEDURE — 80053 COMPREHEN METABOLIC PANEL: CPT

## 2023-01-02 PROCEDURE — 63600175 PHARM REV CODE 636 W HCPCS: Performed by: STUDENT IN AN ORGANIZED HEALTH CARE EDUCATION/TRAINING PROGRAM

## 2023-01-02 PROCEDURE — 25000003 PHARM REV CODE 250: Performed by: STUDENT IN AN ORGANIZED HEALTH CARE EDUCATION/TRAINING PROGRAM

## 2023-01-02 PROCEDURE — 99900035 HC TECH TIME PER 15 MIN (STAT)

## 2023-01-02 PROCEDURE — 85025 COMPLETE CBC W/AUTO DIFF WBC: CPT

## 2023-01-02 PROCEDURE — 99222 PR INITIAL HOSPITAL CARE,LEVL II: ICD-10-PCS | Mod: 25,,, | Performed by: UROLOGY

## 2023-01-02 PROCEDURE — 21400001 HC TELEMETRY ROOM

## 2023-01-02 PROCEDURE — 84100 ASSAY OF PHOSPHORUS: CPT

## 2023-01-02 PROCEDURE — 51705 CHANGE OF BLADDER TUBE: CPT | Mod: ,,, | Performed by: UROLOGY

## 2023-01-02 PROCEDURE — 83735 ASSAY OF MAGNESIUM: CPT

## 2023-01-02 PROCEDURE — C9113 INJ PANTOPRAZOLE SODIUM, VIA: HCPCS

## 2023-01-02 RX ORDER — GUAIFENESIN 600 MG/1
600 TABLET, EXTENDED RELEASE ORAL 2 TIMES DAILY
Status: DISCONTINUED | OUTPATIENT
Start: 2023-01-02 | End: 2023-01-03 | Stop reason: HOSPADM

## 2023-01-02 RX ORDER — AMOXICILLIN 250 MG
1 CAPSULE ORAL DAILY
Status: DISCONTINUED | OUTPATIENT
Start: 2023-01-03 | End: 2023-01-03 | Stop reason: HOSPADM

## 2023-01-02 RX ORDER — ONDANSETRON 4 MG/1
4 TABLET, ORALLY DISINTEGRATING ORAL EVERY 6 HOURS PRN
Status: DISCONTINUED | OUTPATIENT
Start: 2023-01-02 | End: 2023-01-03 | Stop reason: HOSPADM

## 2023-01-02 RX ORDER — SODIUM CHLORIDE 9 MG/ML
INJECTION, SOLUTION INTRAVENOUS CONTINUOUS
Status: DISCONTINUED | OUTPATIENT
Start: 2023-01-02 | End: 2023-01-02

## 2023-01-02 RX ORDER — LACTULOSE 10 G/15ML
15 SOLUTION ORAL EVERY 6 HOURS PRN
Status: DISCONTINUED | OUTPATIENT
Start: 2023-01-02 | End: 2023-01-03 | Stop reason: HOSPADM

## 2023-01-02 RX ORDER — SODIUM CHLORIDE 9 MG/ML
INJECTION, SOLUTION INTRAVENOUS
Status: DISCONTINUED | OUTPATIENT
Start: 2023-01-02 | End: 2023-01-03 | Stop reason: HOSPADM

## 2023-01-02 RX ADMIN — GABAPENTIN 600 MG: 300 CAPSULE ORAL at 02:01

## 2023-01-02 RX ADMIN — GUAIFENESIN 600 MG: 600 TABLET, EXTENDED RELEASE ORAL at 08:01

## 2023-01-02 RX ADMIN — LACTULOSE 15 G: 10 SOLUTION ORAL at 08:01

## 2023-01-02 RX ADMIN — Medication 6 MG: at 08:01

## 2023-01-02 RX ADMIN — TAMSULOSIN HYDROCHLORIDE 0.4 MG: 0.4 CAPSULE ORAL at 08:01

## 2023-01-02 RX ADMIN — PANTOPRAZOLE SODIUM 40 MG: 40 INJECTION, POWDER, FOR SOLUTION INTRAVENOUS at 08:01

## 2023-01-02 RX ADMIN — NITROFURANTOIN (MONOHYDRATE/MACROCRYSTALS) 100 MG: 75; 25 CAPSULE ORAL at 08:01

## 2023-01-02 RX ADMIN — SODIUM CHLORIDE: 0.9 INJECTION, SOLUTION INTRAVENOUS at 02:01

## 2023-01-02 RX ADMIN — CEFTRIAXONE 1 G: 1 INJECTION, SOLUTION INTRAVENOUS at 02:01

## 2023-01-02 RX ADMIN — POLYETHYLENE GLYCOL 3350 17 G: 17 POWDER, FOR SOLUTION ORAL at 08:01

## 2023-01-02 RX ADMIN — ROPINIROLE HYDROCHLORIDE 4 MG: 1 TABLET, FILM COATED ORAL at 08:01

## 2023-01-02 RX ADMIN — BACLOFEN 20 MG: 10 TABLET ORAL at 08:01

## 2023-01-02 RX ADMIN — SENNOSIDES 8.6 MG: 8.6 TABLET, FILM COATED ORAL at 08:01

## 2023-01-02 RX ADMIN — LACTULOSE 15 G: 10 SOLUTION ORAL at 02:01

## 2023-01-02 RX ADMIN — GABAPENTIN 600 MG: 300 CAPSULE ORAL at 08:01

## 2023-01-02 RX ADMIN — DILTIAZEM HYDROCHLORIDE 60 MG: 30 TABLET, FILM COATED ORAL at 08:01

## 2023-01-02 RX ADMIN — DOCUSATE SODIUM 100 MG: 100 CAPSULE, LIQUID FILLED ORAL at 08:01

## 2023-01-02 RX ADMIN — Medication 1 TABLET: at 08:01

## 2023-01-02 RX ADMIN — MAGNESIUM OXIDE TAB 400 MG (241.3 MG ELEMENTAL MG) 400 MG: 400 (241.3 MG) TAB at 08:01

## 2023-01-02 NOTE — ASSESSMENT & PLAN NOTE
States last BM was 3 days before admit  Distended abd, mildly tender  Denies diarrhea, blood in stool  Pos bowel sounds  CT scan showed significant bowel distension 2/2 to stool burden  Had BM before enema   Enemas producing minimal liquid stool following day of profuse stooling  Surgery not recommending procedure   Stool burden significantly improved    PLAN:  -Miralax and Senna-docusate daily  -Soap suds enema PRN  -Lactulose PRN

## 2023-01-02 NOTE — ASSESSMENT & PLAN NOTE
2 days of  Constant coffee-ground/blood-streaked emesis  Hgb 13.9   Denies blood per rectum  Significant fecal load  T+C drawn  Stool burden significantly improved    PLAN:  -Regular diet  -Zofran prn  -GI consulted. Appreciate recs. Protonix 40mg IV BID. Plan for EGD 1/3  -Maintain 2 large bore IVs  -Maintain Type and Screen. Transfuse for goal Hgb > 7

## 2023-01-02 NOTE — ASSESSMENT & PLAN NOTE
No vomiting since placement of NG  NG output 2600CC on 12/31/22    Plan:  -NG removed  -Antiemetics PRN

## 2023-01-02 NOTE — PROGRESS NOTES
"[facility]    CC: GI bleed     HPI: This is 63 y/o male hx dwarfism, resident of [facility] nursing here for abd distention, and vomiting with coffee grounds.  GI consulted for gi bleed  Pt states past 3 or so days progressive , worsening abd distention, upper abd , worse than before, has had a couple similar episodes in past but not this bad, has had constipation in past, developed vomiting today that turned dark black with some red streaks. Last BM 3 days prior.   He is not sure if he has had prior endoscopy     Ct showing marked constipation with fecal load, as well as colonic interposition creating what seems to be mass effect on the duodenal C loop causing partial gastric outlet / upper GI obstruction.    Interval history:  Feeling well this morning though complains he can not sleep. No further episodes of overt GI blood loss. Is passing gas and having bowel movements. Wants NG tube removed- tolerating liquid diet.     Review of Systems:  Gastrointestinal ROS: Abdominal pain still present, but improved    Physical Examination  /71 (Patient Position: Lying)   Pulse 84   Temp 97.5 °F (36.4 °C) (Oral)   Resp 18   Ht 4' 3" (1.295 m)   Wt 51.8 kg (114 lb 3.2 oz)   SpO2 (!) 93%   BMI 30.87 kg/m²   General appearance: alert, cooperative, no distress  HENT: Normocephalic, atraumatic, neck symmetrical, no nasal discharge   Lungs: clear to auscultation bilaterally, no dullness to percussion bilaterally  Heart: regular rate and rhythm without rub; no displacement of the PMI   Abdomen: soft, mild tenderness; bowel sounds normoactive; no organomegaly  Extremities: extremities symmetric; no clubbing, cyanosis, or edema  Neurologic: Alert and oriented X 3, normal strength    Assessment:   62 year old male with:    GI bleeding- came in with anemia and hematemesis, but nothing since. Ddx includes PUD vs gastritis/esophagitis vs MWT. Hemoglobin stable and no further overt bleeding     2. Constipation- noted " on imaging with large stool load. Planned decompression prior to endoscopy. Improved with scheduled laxatives and enemas.      Plan:  -EGD tomorrow; npo at midnight  -PPI IV BID   -Monitor hemoglobin and transfuse as appropriate   -Advance diet as tolerated   -Recommend removing NG tube  -Okay to stop daily enemas and trial of oral laxatives.       Patient will be discussed and evaluated by staff physician. Please see their attestation for any changes to the current assessment or  plan.     Hi Tucker MD  [employer] Fellow, PGY- IV  Pager: [phone]

## 2023-01-02 NOTE — ASSESSMENT & PLAN NOTE
Permanent suprapubic catheter. Changed on 1/2 by Urology  Hx of UTIs  UA w/PRO,  Occult blood +2, Arnol +3, deann on micro   Cx w/ Proteus Mirabilis unable to quantify and E.coli >  100,000 CFU    PLAN:  -Continue 1g Rocephin qd with end date 1/8   -Continue Macrobid for end date 1/8 due to concern for ESBL w/ resistance

## 2023-01-02 NOTE — CONSULTS
[facility] Surg  Urology  Consult Note    Patient Name: Shaw Heredia  MRN: [id number]  Admission Date: 12/29/2022  Attending Provider: Marychuy Brand MD   Consulting Provider: Jewel Alvarado MD  Principal Problem:GI bleed    Inpatient consult to Urology  Consult performed by: Jewel Alvarado MD  Consult ordered by: Marychuy Brand MD        Subjective:     HPI:   Shaw Heredia is a 62 y.o. male who presents with GI bleed, constipation, UTI.  Noted to have leaking around SPT and UTI, SPT needs changing.  Urology consulted.  Denies problems with SPT prior to admit, not sure when placed.     Pt reports unknown urologist in past, SPT changed by home health.          PMH:  pmhx: dwarfism, neurogenic bladder with suprapubic catheter, quadriplegia (uses wheelchair), restless leg, muscle spasms, HTN, ileus, former etoh user, smoker,  uses home O2    Review of patient's allergies indicates:  No Known Allergies    Family History    None         Tobacco Use    Smoking status: Not on file    Smokeless tobacco: Not on file   Substance and Sexual Activity    Alcohol use: Not on file    Drug use: Not on file    Sexual activity: Not on file       Review of Systems   Constitutional:  Negative for activity change, chills and fever.   Respiratory:  Negative for chest tightness and shortness of breath.    Cardiovascular:  Negative for chest pain and palpitations.   Gastrointestinal:  Positive for abdominal distention, constipation and nausea. Negative for abdominal pain and vomiting.   Genitourinary:  Positive for difficulty urinating. Negative for flank pain, frequency, hematuria, nocturia and urgency.   Musculoskeletal:  Negative for arthralgias and neck pain.   Skin:  Negative for wound.   Neurological:  Negative for dizziness.   Psychiatric/Behavioral:  Negative for confusion.      Objective:     Temp:  [97.9 °F (36.6 °C)-99.3 °F (37.4 °C)] 98.6 °F (37 °C)  Pulse:  [67-83] 83  Resp:  [18-19] 19  SpO2:  [94 %-97 %] 96  %  BP: (131-177)/(68-83) 133/72       NAD  RRR  CTAB  ABD S/NTTP/NGT capped in place       SPT with leakage around.     Existing SPT removed, noted 25 cc in balloon.  Under sterile conditions, 20F  Sifuentes catheter placed into bladder with return of clear urine at SPT site.  Balloon inflated with 25cc sterile water.  Placed to gravity drainage.  Irrigated to confirm correct position         Ext: no edema      Significant Labs:  BMP:  Recent Labs   Lab 12/31/22  0707 01/01/23  0533 01/01/23  1326    139 137   K 3.0* 2.6* 3.7   CL 90* 89* 90*   CO2 35* 25 26   BUN 38* 25* 17   CREATININE 0.6 0.6 0.7   CALCIUM 8.1* 8.4* 8.4*       CBC:  Recent Labs   Lab 12/30/22  0510 12/31/22  0707 01/01/23  0534   WBC 11.23 6.58 7.39   HGB 12.3* 10.4* 11.1*   HCT 39.4* 32.8* 35.7*    231 217           Results for orders placed or performed during the hospital encounter of 12/29/22 (from the past 2160 hour(s))   CT Abdomen Pelvis With Contrast    Impression    1. Large colonic and rectal stool burden resulting suggesting constipation, with rectal impaction not excluded.  Convincing evidence for associated small bowel obstruction or stercoral proctocolitis at this time.  2. Marked distension of the stomach to the level of the duodenal C-loop which remains patent, likely related to mass effect from stool-filled and distended right hepatic flexure large bowel loops with concern for least partial/developing gastric outlet obstruction.  Correlation advised and further evaluation/follow-up as warranted.  Surgical consultation may be warranted.  3. Elevated left hemidiaphragm presumably related to mass effect from marked gastric distension, with resultant left lower lobe atelectasis volume loss and leftward shift of the heart and lower mediastinum.  Bibasilar dependent atelectasis with scattered air bronchograms, for which superimposed aspiration or pneumonia is not excluded.  4. Remote cholecystectomy.  5. Bilateral renal  cortical scarring with several scattered subcentimeter hypoattenuating cortical foci which are too small to characterize.  6. Urinary bladder mostly decompressed around a suprapubic Sifeuntes catheter.  There is urinary bladder wall thickening with subtle perivesicular inflammatory change concerning for superimposed nonspecific cystitis.  No associated hydronephrosis.  Clinical correlation and with urinalysis advised.  7. Coronary and systemic atherosclerosis with cardiomegaly.  8. Thoracolumbar extensive postoperative change with exaggerated kyphosis and multilevel chronic anterior wedge compression deformities.  No acute or destructive osseous process seen.  9. Additional findings as above.  This report was flagged in Epic as abnormal.      Electronically signed by: Eren Rosenthal MD  Date:    12/29/2022  Time:    16:20       Significant Imaging:  CT: I have reviewed all results within the past 24 hours and my personal findings are:  Cath in bladder no hydronephrosis, BWT      Assessment:     Problem Noted   Uti (Urinary Tract Infection) 12/29/2022    SPT leaking  UTI       Plan:     SPT changed  If this does not resolve leaking consider oxybutynin 5 mg TID PRN for leakage after constipation resolved, NGT removed  Will sign off please call with questions.   Change SPT with home health as was done previously  Follow up with his home urologist.     Thank you for your consult.     Jewel Alvarado MD  Urology-[place]

## 2023-01-02 NOTE — SUBJECTIVE & OBJECTIVE
Interval History: No acute events overnight. This AM, suprapubic catheter changed by Urology. NG tube removed. Tolerating liquid diet. Plan for EGD tomorrow with GI.     Review of Systems   Constitutional:  Positive for appetite change. Negative for fever.   Respiratory:  Negative for cough and shortness of breath.    Cardiovascular:  Negative for chest pain, palpitations and leg swelling.   Gastrointestinal:  Positive for constipation. Negative for abdominal pain, nausea and vomiting.   Musculoskeletal:  Negative for arthralgias and myalgias.   Skin:  Negative for rash.   Neurological:  Negative for dizziness and weakness.   Objective:     Vital Signs (Most Recent):  Temp: 98.3 °F (36.8 °C) (01/02/23 1524)  Pulse: 84 (01/02/23 1524)  Resp: 18 (01/02/23 1524)  BP: 134/78 (01/02/23 1524)  SpO2: 96 % (01/02/23 1524) Vital Signs (24h Range):  Temp:  [97.5 °F (36.4 °C)-99.3 °F (37.4 °C)] 98.3 °F (36.8 °C)  Pulse:  [72-84] 84  Resp:  [18-19] 18  SpO2:  [93 %-96 %] 96 %  BP: (124-162)/(68-78) 134/78     Weight: 51.8 kg (114 lb 3.2 oz)  Body mass index is 30.87 kg/m².    Intake/Output Summary (Last 24 hours) at 1/2/2023 1552  Last data filed at 1/2/2023 1432  Gross per 24 hour   Intake 120 ml   Output 1225 ml   Net -1105 ml      Physical Exam  Constitutional:       Appearance: Normal appearance.   HENT:      Head: Normocephalic and atraumatic.   Cardiovascular:      Rate and Rhythm: Normal rate and regular rhythm.      Pulses: Normal pulses.      Heart sounds: Murmur heard.      Comments: 3/5 sys murmur    Pulmonary:      Effort: Pulmonary effort is normal.      Breath sounds: Normal breath sounds.   Abdominal:      General: Abdomen is flat. There is no distension.      Palpations: Abdomen is soft.      Tenderness: There is no abdominal tenderness.      Comments: Abdomen distention improved   Genitourinary:     Comments: Suprapubic catheter in place   No evidence of infection  Musculoskeletal:      Right lower leg: No  edema.      Left lower leg: No edema.   Neurological:      Mental Status: He is alert and oriented to person, place, and time.   Psychiatric:         Mood and Affect: Mood normal.         Behavior: Behavior normal.         Thought Content: Thought content normal.         Judgment: Judgment normal.       Significant Labs: All pertinent labs within the past 24 hours have been reviewed.    Significant Imaging: I have reviewed all pertinent imaging results/findings within the past 24 hours.

## 2023-01-02 NOTE — PROGRESS NOTES
[facility] Medicine  Progress Note    Patient Name: Shaw Heredia  MRN: [id number]  Patient Class: IP- Inpatient   Admission Date: 12/29/2022  Length of Stay: 1 days  Attending Physician: Marychuy Brand MD  Primary Care Provider: Primary Doctor No        Subjective:     Principal Problem:GI bleed        HPI:  61 y/o male from [facility], pmhx: dwarfism, neurogenic bladder with suprapubic catheter, paraplegia (uses wheelchair), restless leg, muscle spasms, HTN, ileus, former etoh user, smoker,  uses home O2 (2lpm).    C/o abdominal bloating for the past 4 days along with coffee-ground and red-streaked emesis for two days (upwards of 10x). Denies bloody/dark stool, GERD, fever, chills, diarrhea,  chest pain, SOB. States last BM was 3 days ago.      In Emergency, 102/69, , 99.1F, SpO2  96% 3lpm. Labs were significant for WBC 15.54, Hgb 13.9 (2mo ago 11.5), Cl- 86, HCO3- 39, BUN 60, Cr wnl, anion gap 19, UA PRO +2, Occult blood +2, Leuks +3, many deann on micro. Trip,  lipase , INR, wnl. CXR showed evidence of distended abd. CT showed Large colonic and rectal stool burden resulting suggesting constipation, with rectal impaction not excluded. Convincing evidence for associated small bowel obstruction or stercoral proctocolitis at this time. Marked distension of the stomach to the level of the duodenal C-loop which remains patent, likely related to mass effect from stool-filled and distended right hepatic flexure large bowel loops with concern for least partial/developing gastric outlet obstruction. He was started on Rocephin 1g, IV protonix. GI and Surgery were consulted.  He was admitted for UTI, susp GI bleed and susp fecal impaction.       Interval History: No acute events overnight. This AM, suprapubic catheter changed by Urology. NG tube removed. Tolerating liquid diet. Denies any hematemesis. States he does not think he had a BM yesterday. Plan for EGD tomorrow with GI.     Review of  Systems   Constitutional:  Positive for appetite change. Negative for fever.   Respiratory:  Negative for cough and shortness of breath.    Cardiovascular:  Negative for chest pain, palpitations and leg swelling.   Gastrointestinal:  Positive for constipation. Negative for abdominal pain, nausea and vomiting.   Musculoskeletal:  Negative for arthralgias and myalgias.   Skin:  Negative for rash.   Neurological:  Negative for dizziness and weakness.   Objective:     Vital Signs (Most Recent):  Temp: 98.3 °F (36.8 °C) (01/02/23 1524)  Pulse: 84 (01/02/23 1524)  Resp: 18 (01/02/23 1524)  BP: 134/78 (01/02/23 1524)  SpO2: 96 % (01/02/23 1524) Vital Signs (24h Range):  Temp:  [97.5 °F (36.4 °C)-99.3 °F (37.4 °C)] 98.3 °F (36.8 °C)  Pulse:  [72-84] 84  Resp:  [18-19] 18  SpO2:  [93 %-96 %] 96 %  BP: (124-162)/(68-78) 134/78     Weight: 51.8 kg (114 lb 3.2 oz)  Body mass index is 30.87 kg/m².    Intake/Output Summary (Last 24 hours) at 1/2/2023 1552  Last data filed at 1/2/2023 1432  Gross per 24 hour   Intake 120 ml   Output 1225 ml   Net -1105 ml      Physical Exam  Constitutional:       Appearance: Normal appearance.   HENT:      Head: Normocephalic and atraumatic.   Cardiovascular:      Rate and Rhythm: Normal rate and regular rhythm.      Pulses: Normal pulses.      Heart sounds: Murmur heard.      Comments: 3/5 sys murmur    Pulmonary:      Effort: Pulmonary effort is normal.      Breath sounds: Normal breath sounds.   Abdominal:      General: Abdomen is flat. There is no distension.      Palpations: Abdomen is soft.      Tenderness: There is no abdominal tenderness.      Comments: Abdomen distention improved   Genitourinary:     Comments: Suprapubic catheter in place   No evidence of infection  Musculoskeletal:      Right lower leg: No edema.      Left lower leg: No edema.   Neurological:      Mental Status: He is alert and oriented to person, place, and time.   Psychiatric:         Mood and Affect: Mood normal.          Behavior: Behavior normal.         Thought Content: Thought content normal.         Judgment: Judgment normal.       Significant Labs: All pertinent labs within the past 24 hours have been reviewed.    Significant Imaging: I have reviewed all pertinent imaging results/findings within the past 24 hours.      Assessment/Plan:      * GI bleed  2 days of  Constant coffee-ground/blood-streaked emesis  Hgb 13.9   Denies blood per rectum  Significant fecal load  T+C drawn  Stool burden significantly improved    PLAN:  -Regular diet  -Zofran prn  -GI consulted. Appreciate recs. Protonix 40mg IV BID. Plan for EGD 1/3  -Maintain 2 large bore IVs  -Maintain Type and Screen. Transfuse for goal Hgb > 7    Vomiting  No vomiting since placement of NG  NG output 2600CC on 12/31/22    Plan:  -NG removed  -Antiemetics PRN    Partial gastric outlet obstruction  Obstructive pain suspected 2/2 to stool burden    Plan:  -Advance diet as tolerated  -Monitor stool output  -Will continue to reassess need for TID enema      HTN (hypertension)  Plan:  Continue home Diltiazem 60mg bid  Holding losartan 5mg (normotensive)    Muscle spasms of both lower extremities  Continue home regimen   Baclofen 20mg qhs  Gabapentin 600mg tid  ropinirole 4mg qhs    UTI (urinary tract infection)  Permanent suprapubic catheter. Changed on 1/2 by Urology  Hx of UTIs  UA w/PRO,  Occult blood +2, Arnol +3, deann on micro   Cx w/ Proteus Mirabilis unable to quantify and E.coli >  100,000 CFU    PLAN:  -Continue 1g Rocephin qd with end date 1/8   -Continue Macrobid for end date 1/8 due to concern for ESBL w/ resistance    Constipation  States last BM was 3 days before admit  Distended abd, mildly tender  Denies diarrhea, blood in stool  Pos bowel sounds  CT scan showed significant bowel distension 2/2 to stool burden  Had BM before enema   Enemas producing minimal liquid stool following day of profuse stooling  Surgery not recommending procedure   Stool burden  significantly improved    PLAN:  -Miralax and Senna-docusate daily  -Soap suds enema PRN  -Lactulose PRN          VTE Risk Mitigation (From admission, onward)           Ordered     IP VTE HIGH RISK PATIENT  Once         12/29/22 1537     Place sequential compression device  Until discontinued         12/29/22 1537                    Discharge Planning   OLIVER:      Code Status: Full Code   Is the patient medically ready for discharge?:     Reason for patient still in hospital (select all that apply): Consult recommendations and Pending disposition  Discharge Plan A: Return to nursing home          Martha Villatoro MD  [facility], PGY-1  01/02/2023

## 2023-01-02 NOTE — PLAN OF CARE
Problem: Adult Inpatient Plan of Care  Goal: Plan of Care Review  Outcome: Ongoing, Progressing  Goal: Patient-Specific Goal (Individualized)  Outcome: Ongoing, Progressing  Goal: Absence of Hospital-Acquired Illness or Injury  Outcome: Ongoing, Progressing  Goal: Optimal Comfort and Wellbeing  Outcome: Ongoing, Progressing     Problem: Infection (Intestinal Obstruction)  Goal: Absence of Infection Signs and Symptoms  Outcome: Ongoing, Progressing     Problem: Pain (Intestinal Obstruction)  Goal: Acceptable Pain Control  Outcome: Ongoing, Progressing     Problem: Fall Injury Risk  Goal: Absence of Fall and Fall-Related Injury  Outcome: Ongoing, Progressing     Problem: Skin Injury Risk Increased  Goal: Skin Health and Integrity  Outcome: Ongoing, Progressing     Problem: Constipation  Goal: Effective Bowel Elimination  Outcome: Ongoing, Progressing     POC reviewed with patient. All questions and concerns addressed. Fall/safety precautions implemented and maintained. Suprapubic care performed. NGT in place to R nare clamped. No acute events noted this shift. Please see flowsheet for full assessment and vitals. Bed locked in lowest position. Side rails up x2. Call bell within reach. Will continue to monitor.

## 2023-01-02 NOTE — ASSESSMENT & PLAN NOTE
Obstructive pain suspected 2/2 to stool burden    Plan:  -Advance diet as tolerated  -Monitor stool output  -Will continue to reassess need for TID enema

## 2023-01-03 ENCOUNTER — ANESTHESIA EVENT (OUTPATIENT)
Dept: ENDOSCOPY | Facility: HOSPITAL | Age: 63
DRG: 368 | End: 2023-01-03
Payer: MEDICAID

## 2023-01-03 ENCOUNTER — ANESTHESIA (OUTPATIENT)
Dept: ENDOSCOPY | Facility: HOSPITAL | Age: 63
DRG: 368 | End: 2023-01-03
Payer: MEDICAID

## 2023-01-03 ENCOUNTER — TELEPHONE (OUTPATIENT)
Dept: GASTROENTEROLOGY | Facility: CLINIC | Age: 63
End: 2023-01-03
Payer: MEDICAID

## 2023-01-03 VITALS
DIASTOLIC BLOOD PRESSURE: 55 MMHG | RESPIRATION RATE: 18 BRPM | BODY MASS INDEX: 22.55 KG/M2 | HEART RATE: 98 BPM | SYSTOLIC BLOOD PRESSURE: 110 MMHG | HEIGHT: 60 IN | WEIGHT: 114.88 LBS | OXYGEN SATURATION: 97 % | TEMPERATURE: 97 F

## 2023-01-03 LAB
ALBUMIN SERPL BCP-MCNC: 2.8 G/DL (ref 3.5–5.2)
ALP SERPL-CCNC: 62 U/L (ref 55–135)
ALT SERPL W/O P-5'-P-CCNC: 22 U/L (ref 10–44)
ANION GAP SERPL CALC-SCNC: 7 MMOL/L (ref 8–16)
AST SERPL-CCNC: 24 U/L (ref 10–40)
BASOPHILS # BLD AUTO: 0.02 K/UL (ref 0–0.2)
BASOPHILS NFR BLD: 0.3 % (ref 0–1.9)
BILIRUB SERPL-MCNC: 0.3 MG/DL (ref 0.1–1)
BUN SERPL-MCNC: 8 MG/DL (ref 8–23)
CALCIUM SERPL-MCNC: 8.4 MG/DL (ref 8.7–10.5)
CHLORIDE SERPL-SCNC: 94 MMOL/L (ref 95–110)
CO2 SERPL-SCNC: 32 MMOL/L (ref 23–29)
CREAT SERPL-MCNC: 0.5 MG/DL (ref 0.5–1.4)
DIFFERENTIAL METHOD: ABNORMAL
EOSINOPHIL # BLD AUTO: 0.3 K/UL (ref 0–0.5)
EOSINOPHIL NFR BLD: 4.2 % (ref 0–8)
ERYTHROCYTE [DISTWIDTH] IN BLOOD BY AUTOMATED COUNT: 12.6 % (ref 11.5–14.5)
EST. GFR  (NO RACE VARIABLE): >60 ML/MIN/1.73 M^2
GLUCOSE SERPL-MCNC: 114 MG/DL (ref 70–110)
HCT VFR BLD AUTO: 34.6 % (ref 40–54)
HGB BLD-MCNC: 10.9 G/DL (ref 14–18)
IMM GRANULOCYTES # BLD AUTO: 0.05 K/UL (ref 0–0.04)
IMM GRANULOCYTES NFR BLD AUTO: 0.8 % (ref 0–0.5)
LYMPHOCYTES # BLD AUTO: 2.7 K/UL (ref 1–4.8)
LYMPHOCYTES NFR BLD: 42.2 % (ref 18–48)
MAGNESIUM SERPL-MCNC: 1.8 MG/DL (ref 1.6–2.6)
MCH RBC QN AUTO: 28 PG (ref 27–31)
MCHC RBC AUTO-ENTMCNC: 31.5 G/DL (ref 32–36)
MCV RBC AUTO: 89 FL (ref 82–98)
MONOCYTES # BLD AUTO: 0.4 K/UL (ref 0.3–1)
MONOCYTES NFR BLD: 6.6 % (ref 4–15)
NEUTROPHILS # BLD AUTO: 2.9 K/UL (ref 1.8–7.7)
NEUTROPHILS NFR BLD: 45.9 % (ref 38–73)
NRBC BLD-RTO: 0 /100 WBC
PHOSPHATE SERPL-MCNC: 1.5 MG/DL (ref 2.7–4.5)
PLATELET # BLD AUTO: 193 K/UL (ref 150–450)
PMV BLD AUTO: 9.5 FL (ref 9.2–12.9)
POTASSIUM SERPL-SCNC: 3.9 MMOL/L (ref 3.5–5.1)
PROT SERPL-MCNC: 5.7 G/DL (ref 6–8.4)
RBC # BLD AUTO: 3.89 M/UL (ref 4.6–6.2)
SODIUM SERPL-SCNC: 133 MMOL/L (ref 136–145)
WBC # BLD AUTO: 6.38 K/UL (ref 3.9–12.7)

## 2023-01-03 PROCEDURE — C1751 CATH, INF, PER/CENT/MIDLINE: HCPCS

## 2023-01-03 PROCEDURE — 25000003 PHARM REV CODE 250: Performed by: NURSE ANESTHETIST, CERTIFIED REGISTERED

## 2023-01-03 PROCEDURE — 37000008 HC ANESTHESIA 1ST 15 MINUTES: Performed by: INTERNAL MEDICINE

## 2023-01-03 PROCEDURE — 84100 ASSAY OF PHOSPHORUS: CPT

## 2023-01-03 PROCEDURE — 80053 COMPREHEN METABOLIC PANEL: CPT

## 2023-01-03 PROCEDURE — 83735 ASSAY OF MAGNESIUM: CPT

## 2023-01-03 PROCEDURE — D9220A PRA ANESTHESIA: ICD-10-PCS | Mod: ANES,,, | Performed by: ANESTHESIOLOGY

## 2023-01-03 PROCEDURE — 99900035 HC TECH TIME PER 15 MIN (STAT)

## 2023-01-03 PROCEDURE — 63600175 PHARM REV CODE 636 W HCPCS: Performed by: NURSE ANESTHETIST, CERTIFIED REGISTERED

## 2023-01-03 PROCEDURE — 94761 N-INVAS EAR/PLS OXIMETRY MLT: CPT

## 2023-01-03 PROCEDURE — 36410 VNPNXR 3YR/> PHY/QHP DX/THER: CPT

## 2023-01-03 PROCEDURE — 27000221 HC OXYGEN, UP TO 24 HOURS

## 2023-01-03 PROCEDURE — 43235 EGD DIAGNOSTIC BRUSH WASH: CPT | Mod: ,,, | Performed by: INTERNAL MEDICINE

## 2023-01-03 PROCEDURE — 25000003 PHARM REV CODE 250

## 2023-01-03 PROCEDURE — 85025 COMPLETE CBC W/AUTO DIFF WBC: CPT

## 2023-01-03 PROCEDURE — 63600175 PHARM REV CODE 636 W HCPCS

## 2023-01-03 PROCEDURE — 36415 COLL VENOUS BLD VENIPUNCTURE: CPT

## 2023-01-03 PROCEDURE — D9220A PRA ANESTHESIA: Mod: ANES,,, | Performed by: ANESTHESIOLOGY

## 2023-01-03 PROCEDURE — 43235 PR EGD, FLEX, DIAGNOSTIC: ICD-10-PCS | Mod: ,,, | Performed by: INTERNAL MEDICINE

## 2023-01-03 PROCEDURE — D9220A PRA ANESTHESIA: Mod: CRNA,,, | Performed by: NURSE ANESTHETIST, CERTIFIED REGISTERED

## 2023-01-03 PROCEDURE — 43235 EGD DIAGNOSTIC BRUSH WASH: CPT | Performed by: INTERNAL MEDICINE

## 2023-01-03 PROCEDURE — 37000009 HC ANESTHESIA EA ADD 15 MINS: Performed by: INTERNAL MEDICINE

## 2023-01-03 PROCEDURE — D9220A PRA ANESTHESIA: ICD-10-PCS | Mod: CRNA,,, | Performed by: NURSE ANESTHETIST, CERTIFIED REGISTERED

## 2023-01-03 RX ORDER — PROPOFOL 10 MG/ML
VIAL (ML) INTRAVENOUS CONTINUOUS PRN
Status: DISCONTINUED | OUTPATIENT
Start: 2023-01-03 | End: 2023-01-03

## 2023-01-03 RX ORDER — SODIUM CHLORIDE 0.9 % (FLUSH) 0.9 %
10 SYRINGE (ML) INJECTION EVERY 6 HOURS
Status: DISCONTINUED | OUTPATIENT
Start: 2023-01-03 | End: 2023-01-03 | Stop reason: HOSPADM

## 2023-01-03 RX ORDER — SODIUM CHLORIDE 0.9 % (FLUSH) 0.9 %
10 SYRINGE (ML) INJECTION
Status: DISCONTINUED | OUTPATIENT
Start: 2023-01-03 | End: 2023-01-03 | Stop reason: HOSPADM

## 2023-01-03 RX ORDER — DEXMEDETOMIDINE HYDROCHLORIDE 100 UG/ML
INJECTION, SOLUTION INTRAVENOUS
Status: DISCONTINUED | OUTPATIENT
Start: 2023-01-03 | End: 2023-01-03

## 2023-01-03 RX ORDER — ONDANSETRON 2 MG/ML
4 INJECTION INTRAMUSCULAR; INTRAVENOUS ONCE AS NEEDED
Status: CANCELLED | OUTPATIENT
Start: 2023-01-03 | End: 2034-06-01

## 2023-01-03 RX ORDER — SODIUM CHLORIDE 0.9 % (FLUSH) 0.9 %
3 SYRINGE (ML) INJECTION
Status: CANCELLED | OUTPATIENT
Start: 2023-01-03

## 2023-01-03 RX ORDER — PHENYLEPHRINE HYDROCHLORIDE 10 MG/ML
INJECTION INTRAVENOUS
Status: DISCONTINUED | OUTPATIENT
Start: 2023-01-03 | End: 2023-01-03

## 2023-01-03 RX ORDER — LIDOCAINE HCL/PF 100 MG/5ML
SYRINGE (ML) INTRAVENOUS
Status: DISCONTINUED | OUTPATIENT
Start: 2023-01-03 | End: 2023-01-03

## 2023-01-03 RX ORDER — PROPOFOL 10 MG/ML
VIAL (ML) INTRAVENOUS
Status: DISCONTINUED | OUTPATIENT
Start: 2023-01-03 | End: 2023-01-03

## 2023-01-03 RX ORDER — PANTOPRAZOLE SODIUM 40 MG/1
40 TABLET, DELAYED RELEASE ORAL 2 TIMES DAILY
Qty: 60 TABLET | Refills: 1
Start: 2023-01-03 | End: 2023-03-04

## 2023-01-03 RX ORDER — SODIUM CHLORIDE, SODIUM LACTATE, POTASSIUM CHLORIDE, CALCIUM CHLORIDE 600; 310; 30; 20 MG/100ML; MG/100ML; MG/100ML; MG/100ML
INJECTION, SOLUTION INTRAVENOUS CONTINUOUS PRN
Status: DISCONTINUED | OUTPATIENT
Start: 2023-01-03 | End: 2023-01-03

## 2023-01-03 RX ADMIN — DEXMEDETOMIDINE HCL 8 MCG: 100 INJECTION INTRAVENOUS at 09:01

## 2023-01-03 RX ADMIN — PROPOFOL 60 MG: 10 INJECTION, EMULSION INTRAVENOUS at 09:01

## 2023-01-03 RX ADMIN — LIDOCAINE HYDROCHLORIDE 75 MG: 20 INJECTION, SOLUTION INTRAVENOUS at 09:01

## 2023-01-03 RX ADMIN — GABAPENTIN 600 MG: 300 CAPSULE ORAL at 04:01

## 2023-01-03 RX ADMIN — PHENYLEPHRINE HYDROCHLORIDE 200 MCG: 10 INJECTION INTRAVENOUS at 09:01

## 2023-01-03 RX ADMIN — PROPOFOL 150 MCG/KG/MIN: 10 INJECTION, EMULSION INTRAVENOUS at 09:01

## 2023-01-03 RX ADMIN — MEROPENEM 1 G: 1 INJECTION, POWDER, FOR SOLUTION INTRAVENOUS at 12:01

## 2023-01-03 RX ADMIN — SODIUM CHLORIDE, SODIUM LACTATE, POTASSIUM CHLORIDE, AND CALCIUM CHLORIDE: .6; .31; .03; .02 INJECTION, SOLUTION INTRAVENOUS at 09:01

## 2023-01-03 RX ADMIN — GLYCOPYRROLATE 0.2 MG: 0.2 INJECTION, SOLUTION INTRAMUSCULAR; INTRAVITREAL at 09:01

## 2023-01-03 RX ADMIN — PHENYLEPHRINE HYDROCHLORIDE 100 MCG: 10 INJECTION INTRAVENOUS at 09:01

## 2023-01-03 RX ADMIN — TOPICAL ANESTHETIC 1 EACH: 200 SPRAY DENTAL; PERIODONTAL at 09:01

## 2023-01-03 NOTE — ANESTHESIA POSTPROCEDURE EVALUATION
Anesthesia Post Evaluation    Patient: Shaw Heredia    Procedure(s) Performed: Procedure(s) (LRB):  EGD (ESOPHAGOGASTRODUODENOSCOPY) (N/A)    Final Anesthesia Type: general      Patient location during evaluation: GI PACU  Patient participation: Yes- Able to Participate  Level of consciousness: awake and alert and oriented  Post-procedure vital signs: reviewed and stable  Pain management: adequate  Airway patency: patent    PONV status at discharge: No PONV  Anesthetic complications: no      Cardiovascular status: hemodynamically stable  Respiratory status: room air, spontaneous ventilation and unassisted  Hydration status: euvolemic  Follow-up not needed.          Vitals Value Taken Time   /73 01/03/23 0937   Temp 98.1 01/03/23 0937   Pulse 95 01/03/23 0937   Resp 16 01/03/23 0937   SpO2 100 01/03/23 0937         No case tracking events are documented in the log.      Pain/Gracie Score: No data recorded

## 2023-01-03 NOTE — PROVATION PATIENT INSTRUCTIONS
Discharge Summary/Instructions after an Endoscopic Procedure  Patient Name: Shaw Heredia  Patient MRN: [id number]  Patient YOB: 1960  Tuesday, January 3, 2023  Miles Siddiqui MD  Dear patient,  As a result of recent federal legislation (The Federal Cures Act), you may   receive lab or pathology results from your procedure in your [facility]   account before your physician is able to contact you. Your physician or   their representative will relay the results to you with their   recommendations at their soonest availability.  Thank you,  Your health is very important to us during the Covid Crisis. Following your   procedure today, you will receive a daily text for 2 weeks asking about   signs or symptoms of Covid 19.  Please respond to this text when you   receive it so we can follow up and keep you as safe as possible.   RESTRICTIONS:  During your procedure today, you received medications for sedation.  These   medications may affect your judgment, balance and coordination.  Therefore,   for 24 hours, you have the following restrictions:   - DO NOT drive a car, operate machinery, make legal/financial decisions,   sign important papers or drink alcohol.    ACTIVITY:  Today: no heavy lifting, straining or running due to procedural   sedation/anesthesia.  The following day: return to full activity including work.  DIET:  Eat and drink normally unless instructed otherwise.     TREATMENT FOR COMMON SIDE EFFECTS:  - Mild abdominal pain, nausea, belching, bloating or excessive gas:  rest,   eat lightly and use a heating pad.  - Sore Throat: treat with throat lozenges and/or gargle with warm salt   water.  - Because air was used during the procedure, expelling large amounts of air   from your rectum or belching is normal.  - If a bowel prep was taken, you may not have a bowel movement for 1-3 days.    This is normal.  SYMPTOMS TO WATCH FOR AND REPORT TO YOUR PHYSICIAN:  1. Abdominal pain or bloating,  other than gas cramps.  2. Chest pain.  3. Back pain.  4. Signs of infection such as: chills or fever occurring within 24 hours   after the procedure.  5. Rectal bleeding, which would show as bright red, maroon, or black stools.   (A tablespoon of blood from the rectum is not serious, especially if   hemorrhoids are present.)  6. Vomiting.  7. Weakness or dizziness.  GO DIRECTLY TO THE NEAREST EMERGENCY ROOM IF YOU HAVE ANY OF THE FOLLOWING:      Difficulty breathing              Chills and/or fever over 101 F   Persistent vomiting and/or vomiting blood   Severe abdominal pain   Severe chest pain   Black, tarry stools   Bleeding- more than one tablespoon   Any other symptom or condition that you feel may need urgent attention  Your doctor recommends these additional instructions:  If any biopsies were taken, your doctors clinic will contact you in 1 to 2   weeks with any results.  - [facility] same day.   - Resume previous diet.   - Use a proton pump inhibitor PO BID for 8 weeks.   - Repeat upper endoscopy in 8 weeks to evaluate the response to therapy.   - Return to GI office at appointment to be scheduled.  For questions, problems or results please call your physician - Miles Siddiqui MD.  EMERGENCY PHONE NUMBER: [phone],  LAB RESULTS: [phone]  IF A COMPLICATION OR EMERGENCY SITUATION ARISES AND YOU ARE UNABLE TO REACH   YOUR PHYSICIAN - GO DIRECTLY TO THE EMERGENCY ROOM.  Miles Siddiqui MD  1/3/2023 9:39:23 AM  This report has been verified and signed electronically.  Dear patient,  As a result of recent federal legislation (The Federal Cures Act), you may   receive lab or pathology results from your procedure in your [facility]   account before your physician is able to contact you. Your physician or   their representative will relay the results to you with their   recommendations at their soonest availability.  Thank  you,  PROVATION

## 2023-01-03 NOTE — PLAN OF CARE
GI Plan of Care Note    Endoscopic findings:  -LA grade D esophagitis - likely source of coffee ground emesis   -Normal stomach with no ulcers or inflammation   -Normal duodenum     Recommendations:  -Continue PPI BID p.o. at discharge for 8 weeks   -Repeat EGD needed in 8 weeks to assess response to therapy   -Diet as tolerated   -Continue laxatives daily   -Will make GI follow up     Okay for discharge from GI perspective     Hi Tucker MD  GI Fellow

## 2023-01-03 NOTE — CONSULTS
Consult received for meropenem approval.     Patient is a 61 y/o male from [facility] with history of dwarfism, neurogenic bladder with suprapubic catheter, paraplegia (uses wheelchair), restless leg, muscle spasms, HTN, ileus, former etoh user, smoker,  uses home O2 (2lpm). He is admitted with UGI bleed, first noticed coffee-ground emesis x days prior to admission. On admission, Tm 99.3, slightly tachycardic with borderline Bps. Labs significant for WBC 15.54 and UA PRO +2, Occult blood +2, Leuks +3, many deann on micro. He was started on Rocephin 1g, IV protonix and SPC was exchanged by urology.    Admission Ucx with >100K CFU E coli and Proteus mirabilis. Sensitivities for E coli to aminoglycosides and carbapenems. Would initiate treatment, if patient symptomatic, with meropenem 1g q8h x7 days. This should cover the Proteus as well. Per our chart patient does not have history of recurrent UTIs. There was also 1/4 Bcx with CONS, likely a contaminant.     Please do not hesitate to contact us if needed.     Marsha Gerardo MD  [facility] Infectious [facility], PGY-4  Cell: [phone]

## 2023-01-03 NOTE — SUBJECTIVE & OBJECTIVE
Interval History: No acute events overnight. This AM, patient wanting to be discharged but was awaiting EGD. EGD performed. Likely able to be discharged today to [facility] if patient can get line for antibiotics.     Review of Systems   Constitutional:  Positive for appetite change. Negative for fever.   Respiratory:  Negative for cough and shortness of breath.    Cardiovascular:  Negative for chest pain, palpitations and leg swelling.   Gastrointestinal:  Positive for constipation. Negative for abdominal pain, nausea and vomiting.   Musculoskeletal:  Negative for arthralgias and myalgias.   Skin:  Negative for rash.   Neurological:  Negative for dizziness and weakness.   Objective:     Vital Signs (Most Recent):  Temp: 98.1 °F (36.7 °C) (01/03/23 0938)  Pulse: 96 (01/03/23 0938)  Resp: (!) 2 (01/03/23 0938)  BP: 109/64 (01/03/23 0938)  SpO2: 100 % (01/03/23 0938)   Vital Signs (24h Range):  Temp:  [97.5 °F (36.4 °C)-99.3 °F (37.4 °C)] 98.1 °F (36.7 °C)  Pulse:  [77-96] 96  Resp:  [2-18] 2  SpO2:  [95 %-100 %] 100 %  BP: (108-149)/(55-78) 109/64     Weight: 52.1 kg (114 lb 13.8 oz)  Body mass index is 31.05 kg/m².    Intake/Output Summary (Last 24 hours) at 1/3/2023 1125  Last data filed at 1/3/2023 0937  Gross per 24 hour   Intake 200 ml   Output 2625 ml   Net -2425 ml      Physical Exam  Constitutional:       Appearance: Normal appearance.   HENT:      Head: Normocephalic and atraumatic.   Cardiovascular:      Rate and Rhythm: Normal rate and regular rhythm.      Pulses: Normal pulses.      Heart sounds: Murmur heard.      Comments: 3/5 sys murmur    Pulmonary:      Effort: Pulmonary effort is normal.      Breath sounds: Normal breath sounds.   Abdominal:      General: Abdomen is flat. There is no distension.      Palpations: Abdomen is soft.      Tenderness: There is no abdominal tenderness.      Comments: Abdomen distention improved   Genitourinary:     Comments: Suprapubic catheter in place   No evidence of  infection  Musculoskeletal:      Right lower leg: No edema.      Left lower leg: No edema.   Neurological:      Mental Status: He is alert and oriented to person, place, and time.   Psychiatric:         Mood and Affect: Mood normal.         Behavior: Behavior normal.         Thought Content: Thought content normal.         Judgment: Judgment normal.       Significant Labs: All pertinent labs within the past 24 hours have been reviewed.    Significant Imaging: I have reviewed all pertinent imaging results/findings within the past 24 hours.

## 2023-01-03 NOTE — PLAN OF CARE
Problem: Adult Inpatient Plan of Care  Goal: Plan of Care Review  Outcome: Ongoing, Progressing   Chart check complete. Vitals, orders, labs, and progress notes reviewed. Care plan updated. Will monitor.

## 2023-01-03 NOTE — PLAN OF CARE
Patient received on    2Lpm NC with SpO2   95 %. Pt with no apparent distress noted. Will continue to monitor.

## 2023-01-03 NOTE — TRANSFER OF CARE
"Anesthesia Transfer of Care Note    Patient: Shaw Heredia    Procedure(s) Performed: Procedure(s) (LRB):  EGD (ESOPHAGOGASTRODUODENOSCOPY) (N/A)    Patient location: GI    Anesthesia Type: general    Transport from OR: Transported from OR on room air with adequate spontaneous ventilation    Post pain: adequate analgesia    Post assessment: no apparent anesthetic complications and tolerated procedure well    Post vital signs: stable    Level of consciousness: awake    Nausea/Vomiting: no nausea/vomiting    Complications: none    Transfer of care protocol was followed      Last vitals:   Visit Vitals  /75 (BP Location: Left arm, Patient Position: Lying)   Pulse 87   Temp 36.4 °C (97.5 °F) (Temporal)   Resp 16   Ht 4' 3" (1.295 m)   Wt 52.1 kg (114 lb 13.8 oz)   SpO2 98%   BMI 31.05 kg/m²     "

## 2023-01-03 NOTE — PLAN OF CARE
Patient recovered to baseline. Md spoke with patient regarding procedural results . Attending RN notified of procedural results, VSS.  Verbal order to transfer back to unit.      No change in assessment

## 2023-01-03 NOTE — ASSESSMENT & PLAN NOTE
Permanent suprapubic catheter. Changed on 1/2 by Urology  Hx of UTIs  UA w/PRO,  Occult blood +2, Arnol +3, deann on micro   Cx w/ Proteus Mirabilis unable to quantify and E.coli >  100,000 CFU    PLAN:  -ID consulted. Appreciate recs. Switched to Meropenem 1g q8 hr for 7 days

## 2023-01-03 NOTE — NURSING
Patient returned to the floor, demanding to eat stating that he is very hungry, call placed to Dr. Gooden Team and new orders noted.

## 2023-01-03 NOTE — ASSESSMENT & PLAN NOTE
No vomiting - resolved after NG placed. NG removed 1/2  NG output 2600CC on 12/31/22    Plan:  -Antiemetics PRN

## 2023-01-03 NOTE — PLAN OF CARE
Problem: Adult Inpatient Plan of Care  Goal: Plan of Care Review  Outcome: Ongoing, Progressing  Goal: Patient-Specific Goal (Individualized)  Outcome: Ongoing, Progressing  Goal: Absence of Hospital-Acquired Illness or Injury  Outcome: Ongoing, Progressing  Goal: Optimal Comfort and Wellbeing  Outcome: Ongoing, Progressing  Goal: Readiness for Transition of Care  Outcome: Ongoing, Progressing     Problem: Fluid Deficit (Intestinal Obstruction)  Goal: Fluid Balance  Outcome: Ongoing, Progressing     Problem: Infection (Intestinal Obstruction)  Goal: Absence of Infection Signs and Symptoms  Outcome: Ongoing, Progressing     Problem: Nausea and Vomiting (Intestinal Obstruction)  Goal: Nausea and Vomiting Relief  Outcome: Ongoing, Progressing     Problem: Pain (Intestinal Obstruction)  Goal: Acceptable Pain Control  Outcome: Ongoing, Progressing     Problem: Fall Injury Risk  Goal: Absence of Fall and Fall-Related Injury  Outcome: Ongoing, Progressing     Problem: Skin Injury Risk Increased  Goal: Skin Health and Integrity  Outcome: Ongoing, Progressing     Problem: Adjustment to Illness (Gastrointestinal Bleeding)  Goal: Optimal Coping with Acute Illness  Outcome: Ongoing, Progressing     Problem: Bleeding (Gastrointestinal Bleeding)  Goal: Hemostasis  Outcome: Ongoing, Progressing     Problem: Constipation  Goal: Effective Bowel Elimination  Outcome: Ongoing, Progressing     Problem: Hypertension Comorbidity  Goal: Blood Pressure in Desired Range  Outcome: Ongoing, Progressing     Problem: UTI (Urinary Tract Infection)  Goal: Improved Infection Symptoms  Outcome: Ongoing, Progressing

## 2023-01-03 NOTE — CARE UPDATE
[facility]    FACILITY TRANSFER ORDERS      Patient Name: Shaw Heredia  YOB: 1960    PCP: Primary Doctor No   PCP Address: None  PCP Phone Number: None  PCP Fax: None    Encounter Date: 01/03/2023    Admit to: [facility]    Vital Signs:  Routine    Diagnoses:   Active Hospital Problems    Diagnosis  POA    *GI bleed [K92.2]  Yes    Vomiting [R11.10]  Yes    Constipation [K59.00]  Yes    UTI (urinary tract infection) [N39.0]  Yes     SPT leaking  UTI      Muscle spasms of both lower extremities [M62.838]  Yes    HTN (hypertension) [I10]  Yes    Partial gastric outlet obstruction [K31.1]  Yes      Resolved Hospital Problems   No resolved problems to display.       Allergies:Review of patient's allergies indicates:  No Known Allergies    Diet: cardiac diet    Activities: Activity as tolerated    Goals of Care Treatment Preferences:  Code Status: Full Code    Living Will: Yes            Nursing: Per facility     Labs: CBC, CMP monthly    CONSULTS:     to evaluate for community resources/long-range planning.    MISCELLANEOUS CARE:  Sifuentes Care: Empty Sifuentes bag every shift. Change Suprapubic catheter as needed.   Routine Skin for Bedridden Patients: Apply moisture barrier cream to all skin folds and wet areas in perineal area daily and after baths and all bowel movements.  Midline/PICC care per facility. Will need IV antibiotics Meropenem x 7 days (ending 1/10/23)    WOUND CARE ORDERS  None    Medications: Review discharge medications with patient and family and provide education.      Current Discharge Medication List        START taking these medications    Details   MEROPENEM 1 G/100 ML NS, READY TO MIX SYSTEM, Inject 100 mLs (1 g total) into the vein every 8 (eight) hours. for 7 days      pantoprazole (PROTONIX) 40 MG tablet Take 1 tablet (40 mg total) by mouth 2 (two) times daily.  Qty: 60 tablet, Refills: 1    Comments: For total of 8 weeks           CONTINUE these  medications which have NOT CHANGED    Details   acetaminophen (TYLENOL) 325 MG tablet Take 650 mg by mouth every 6 (six) hours as needed for Temperature greater than (100.5).      baclofen (LIORESAL) 20 MG tablet Take 20 mg by mouth nightly.      diltiaZEM (CARDIZEM) 60 MG tablet Take 60 mg by mouth 2 (two) times a day.      docusate sodium (COLACE) 100 MG capsule Take 100 mg by mouth 2 (two) times daily.      ferrous sulfate (FEOSOL) 325 mg (65 mg iron) Tab tablet Take 325 mg by mouth once daily.      furosemide (LASIX) 20 MG tablet Take 20 mg by mouth every other day.      gabapentin (NEURONTIN) 600 MG tablet Take 600 mg by mouth 3 (three) times daily.      guaiFENesin (MUCINEX) 600 mg 12 hr tablet Take 600 mg by mouth 2 (two) times daily as needed for Cough or Congestion.      hydrocodone-acetaminophen (HYCET) solution 7.5-325 mg/15mL Take 15 mLs by mouth every 8 (eight) hours as needed for Pain.      lisinopriL (PRINIVIL,ZESTRIL) 5 MG tablet Take 5 mg by mouth once daily.      magnesium oxide (MAG-OX) 400 mg (241.3 mg magnesium) tablet Take 400 mg by mouth once daily. At 12 noon      melatonin (MELATIN) 5 mg Take 5 mg by mouth every evening.      multivitamin (THERAGRAN) per tablet Take 1 tablet by mouth once daily.      nicotine (NICODERM CQ) 14 mg/24 hr Place 1 patch onto the skin once daily.      ondansetron (ZOFRAN) 4 MG tablet Take 4 mg by mouth every 6 (six) hours as needed for Nausea (or vomiting).      polyethylene glycol (GLYCOLAX) 17 gram/dose powder Take 17 g by mouth once daily. In 8 ounces of water      rOPINIRole (REQUIP) 4 MG tablet Take 4 mg by mouth every evening.      senna (SENOKOT) 8.6 mg tablet Take 2 tablets by mouth nightly as needed for Constipation.      sodium chloride 1 gram tablet Take 1 g by mouth once daily.      tamsulosin (FLOMAX) 0.4 mg Cap Take 0.4 mg by mouth every evening.             !! tiZANidine (ZANAFLEX) 6 mg capsule Take 12 mg by mouth every evening. 2 capsules       traZODone (DESYREL) 100 MG tablet Take 200 mg by mouth every evening.       !! - Potential duplicate medications found. Please discuss with provider.          Martha Villatoro MD  [facility], PGY-1  01/03/2023

## 2023-01-03 NOTE — TELEPHONE ENCOUNTER
----- Message from Hi Tucker MD sent at 1/3/2023  9:43 AM CST -----  Regarding: Follow up  Please make patient a follow up in 6 weeks to schedule repeat EGD.     Thanks,    Hi

## 2023-01-03 NOTE — OR NURSING
Patient scheduled for EGD exan later today. Chart reviewed and report taken from Emily (nurse). NPO since MN, IV in place and able to sign consents.

## 2023-01-03 NOTE — ASSESSMENT & PLAN NOTE
2 days of  Constant coffee-ground/blood-streaked emesis  Hgb 13.9   Denies blood per rectum  Significant fecal load  T+C drawn  Stool burden significantly improved    PLAN:  -Regular diet  -Zofran prn  -GI consulted. Appreciate recs. Protonix BID for 8 weeks and EGD after treatment  -Maintain 2 large bore IVs  -Maintain Type and Screen. Transfuse for goal Hgb > 7

## 2023-01-03 NOTE — TELEPHONE ENCOUNTER
Message placed on [facility].  Hospital follow up appt scheduled on Wednesday, February 15, 2023 at 915am.

## 2023-01-03 NOTE — NURSING
Discharge orders noted. Patient transferring to [facility].   VN available as needed.

## 2023-01-03 NOTE — PLAN OF CARE
Pt is AAOx3. Pt given medications as ordered per MAR. Pt given medications as ordered per MAR. IV abx given as scheduled. NG Tube removed during shift. Pt tolerating advance to regular diet. O2 and Cardiac monitoring in place. Suprapubic cathter in place. Safety maintained. Bed alarm set. Instructed to use call light for assistance. Will continue to monitor.         Problem: Adult Inpatient Plan of Care  Goal: Optimal Comfort and Wellbeing  Outcome: Ongoing, Progressing     Problem: Fall Injury Risk  Goal: Absence of Fall and Fall-Related Injury  Outcome: Ongoing, Progressing     Problem: UTI (Urinary Tract Infection)  Goal: Improved Infection Symptoms  Outcome: Ongoing, Progressing

## 2023-01-03 NOTE — ANESTHESIA PREPROCEDURE EVALUATION
01/03/2023  Shaw Heredia is a 63 y.o., male.      Pre-op Assessment    I have reviewed the Patient Summary Reports.       I have reviewed the Medications.     Review of Systems  Anesthesia Hx:  No previous Anesthesia  History of prior surgery of interest to airway management or planning:   Hematology/Oncology:  Hematology Normal   Oncology Normal     EENT/Dental:EENT/Dental Normal   Cardiovascular:   Exercise tolerance: good Hypertension, well controlled    Pulmonary:  Pulmonary Normal    Renal/:  Renal/ Normal     Hepatic/GI:  Hepatic/GI Normal    Musculoskeletal:  Musculoskeletal Normal    Neurological:  Neurology Normal    Endocrine:  Endocrine Normal    Dermatological:  Skin Normal    Psych:  Psychiatric Normal           Physical Exam  General: Well nourished, Cooperative, Alert and Oriented    Airway:  Mallampati: II   Mouth Opening: Normal  TM Distance: Normal  Tongue: Normal  Neck ROM: Normal ROM    Dental:  Intact        Anesthesia Plan  Type of Anesthesia, risks & benefits discussed:    Anesthesia Type: Gen Natural Airway  Intra-op Monitoring Plan: Standard ASA Monitors  Post Op Pain Control Plan: multimodal analgesia and IV/PO Opioids PRN  Induction:  IV  Airway Plan: , Post-Induction  Informed Consent: Informed consent signed with the Patient and all parties understand the risks and agree with anesthesia plan.  All questions answered.   ASA Score: 2    Ready For Surgery From Anesthesia Perspective.     .

## 2023-01-03 NOTE — PLAN OF CARE
CARA sent facility transfer orders via careport to [facility] [employer] Phone: [phone]. CARA will follow .    Call [phone] report to [address] [place] [address] nurse - room# 205B     STEVE Wakefield  [phone]     01/03/23 1233   Post-Acute Status   Post-Acute Authorization Placement

## 2023-01-03 NOTE — PROGRESS NOTES
[facility] Medicine  Progress Note    Patient Name: Shaw Heredia  MRN: [id number]  Patient Class: IP- Inpatient   Admission Date: 12/29/2022  Length of Stay: 2 days  Attending Physician: Kyle Gooden III, MD  Primary Care Provider: Primary Doctor No        Subjective:     Principal Problem:GI bleed        HPI:  63 y/o male from [facility], pmhx: dwarfism, neurogenic bladder with suprapubic catheter, paraplegia (uses wheelchair), restless leg, muscle spasms, HTN, ileus, former etoh user, smoker,  uses home O2 (2lpm).    C/o abdominal bloating for the past 4 days along with coffee-ground and red-streaked emesis for two days (upwards of 10x). Denies bloody/dark stool, GERD, fever, chills, diarrhea,  chest pain, SOB. States last BM was 3 days ago.      In Emergency, 102/69, , 99.1F, SpO2  96% 3lpm. Labs were significant for WBC 15.54, Hgb 13.9 (2mo ago 11.5), Cl- 86, HCO3- 39, BUN 60, Cr wnl, anion gap 19, UA PRO +2, Occult blood +2, Leuks +3, many deann on micro. Trip,  lipase , INR, wnl. CXR showed evidence of distended abd. CT showed Large colonic and rectal stool burden resulting suggesting constipation, with rectal impaction not excluded. Convincing evidence for associated small bowel obstruction or stercoral proctocolitis at this time. Marked distension of the stomach to the level of the duodenal C-loop which remains patent, likely related to mass effect from stool-filled and distended right hepatic flexure large bowel loops with concern for least partial/developing gastric outlet obstruction. He was started on Rocephin 1g, IV protonix. GI and Surgery were consulted.  He was admitted for UTI, susp GI bleed and susp fecal impaction.         Interval History: No acute events overnight. This AM, patient wanting to be discharged but was awaiting EGD. EGD performed. Likely able to be discharged today to [facility] if patient can get line for antibiotics.     Review of Systems   Constitutional:   Positive for appetite change. Negative for fever.   Respiratory:  Negative for cough and shortness of breath.    Cardiovascular:  Negative for chest pain, palpitations and leg swelling.   Gastrointestinal:  Positive for constipation. Negative for abdominal pain, nausea and vomiting.   Musculoskeletal:  Negative for arthralgias and myalgias.   Skin:  Negative for rash.   Neurological:  Negative for dizziness and weakness.   Objective:     Vital Signs (Most Recent):  Temp: 98.1 °F (36.7 °C) (01/03/23 0938)  Pulse: 96 (01/03/23 0938)  Resp: (!) 2 (01/03/23 0938)  BP: 109/64 (01/03/23 0938)  SpO2: 100 % (01/03/23 0938)   Vital Signs (24h Range):  Temp:  [97.5 °F (36.4 °C)-99.3 °F (37.4 °C)] 98.1 °F (36.7 °C)  Pulse:  [77-96] 96  Resp:  [2-18] 2  SpO2:  [95 %-100 %] 100 %  BP: (108-149)/(55-78) 109/64     Weight: 52.1 kg (114 lb 13.8 oz)  Body mass index is 31.05 kg/m².    Intake/Output Summary (Last 24 hours) at 1/3/2023 1125  Last data filed at 1/3/2023 0937  Gross per 24 hour   Intake 200 ml   Output 2625 ml   Net -2425 ml      Physical Exam  Constitutional:       Appearance: Normal appearance.   HENT:      Head: Normocephalic and atraumatic.   Cardiovascular:      Rate and Rhythm: Normal rate and regular rhythm.      Pulses: Normal pulses.      Heart sounds: Murmur heard.      Comments: 3/5 sys murmur    Pulmonary:      Effort: Pulmonary effort is normal.      Breath sounds: Normal breath sounds.   Abdominal:      General: Abdomen is flat. There is no distension.      Palpations: Abdomen is soft.      Tenderness: There is no abdominal tenderness.      Comments: Abdomen distention improved   Genitourinary:     Comments: Suprapubic catheter in place   No evidence of infection  Musculoskeletal:      Right lower leg: No edema.      Left lower leg: No edema.   Neurological:      Mental Status: He is alert and oriented to person, place, and time.   Psychiatric:         Mood and Affect: Mood normal.         Behavior:  Behavior normal.         Thought Content: Thought content normal.         Judgment: Judgment normal.       Significant Labs: All pertinent labs within the past 24 hours have been reviewed.    Significant Imaging: I have reviewed all pertinent imaging results/findings within the past 24 hours.      Assessment/Plan:      * GI bleed  2 days of  Constant coffee-ground/blood-streaked emesis  Hgb 13.9   Denies blood per rectum  Significant fecal load  T+C drawn  Stool burden significantly improved    PLAN:  -Regular diet  -Zofran prn  -GI consulted. Appreciate recs. Protonix BID for 8 weeks and EGD after treatment  -Maintain 2 large bore IVs  -Maintain Type and Screen. Transfuse for goal Hgb > 7    Vomiting  No vomiting - resolved after NG placed. NG removed 1/2  NG output 2600CC on 12/31/22    Plan:  -Antiemetics PRN    Partial gastric outlet obstruction  Obstructive pain suspected 2/2 to stool burden    Plan:  -Advance diet as tolerated  -Monitor stool output  -Will continue to reassess need for TID enema      HTN (hypertension)  Plan:  Continue home Diltiazem 60mg bid  Holding losartan 5mg (normotensive)    Muscle spasms of both lower extremities  Plan:  Continue home regimen   Baclofen 20mg qhs  Gabapentin 600mg tid  ropinirole 4mg qhs    UTI (urinary tract infection)  Permanent suprapubic catheter. Changed on 1/2 by Urology  Hx of UTIs  UA w/PRO,  Occult blood +2, Arnol +3, deann on micro   Cx w/ Proteus Mirabilis unable to quantify and E.coli >  100,000 CFU    PLAN:  -ID consulted. Appreciate recs. Switched to Meropenem 1g q8 hr for 7 days    Constipation  States last BM was 3 days before admit  Distended abd, mildly tender  Denies diarrhea, blood in stool  Pos bowel sounds  CT scan showed significant bowel distension 2/2 to stool burden  Had BM before enema   Enemas producing minimal liquid stool following day of profuse stooling  Surgery not recommending procedure   Stool burden significantly  improved    PLAN:  -Miralax and Senna-docusate daily  -Soap suds enema PRN  -Lactulose PRN          VTE Risk Mitigation (From admission, onward)         Ordered     IP VTE HIGH RISK PATIENT  Once         12/29/22 1537     Place sequential compression device  Until discontinued         12/29/22 1537                Discharge Planning   OLIVER:      Code Status: Full Code   Is the patient medically ready for discharge?:     Reason for patient still in hospital (select all that apply): Treatment, Consult recommendations and Pending disposition  Discharge Plan A: Return to nursing home            Martha Villatoro MD  [facility], PGY-1  01/03/2023

## 2023-01-03 NOTE — PLAN OF CARE
SW met with pt at bedside to complete final assessment. Pt will d/c today back to [facility] via ambulance. SW will set transport for 4:30pm. Call [phone] report to [address] [place] [address] nurse - room# 205B. Pt has f/u appts listed on avs. Rounds completed on pt.  All questions addressed.  Bedside nurse to discuss d/c medications.  Discussed importance to attend all f/u appts and take medications as prescribed.  Verbalized understanding.    STEVE Wakefield  [phone]     01/03/23 1345   Final Note   Assessment Type Final Discharge Note   Anticipated Discharge Disposition group home [facility]   What phone number can be called within the next 1-3 days to see how you are doing after discharge? [phone]   Hospital Resources/Appts/Education Provided Appointments scheduled and added to AVS   Post-Acute Status   Post-Acute Authorization Placement   Post-Acute Placement Status Set-up Complete/Auth obtained   Coverage Medicaid   Discharge Delays None known at this time

## 2023-01-04 LAB — BACTERIA BLD CULT: NORMAL

## 2023-01-04 NOTE — NURSING
Report given to [facility] nurse, pt being transported via [facility].    Normal rate, regular rhythm, normal S1, S2 heart sounds heard.

## 2023-01-04 NOTE — DISCHARGE SUMMARY
[facility] Medicine  Discharge Summary      Patient Name: Shaw Heredia  MRN: [id number]  CARSON: [id number]  Patient Class: IP- Inpatient  Admission Date: 12/29/2022  Hospital Length of Stay: 2 days  Discharge Date and Time:  01/04/2023 7:00 AM  Attending Physician: Letitia att. providers found   Discharging Provider: Martha Villatoro MD  Primary Care Provider: Primary Doctor Letitia    Primary Care Team: [facility] HOSPITALIST TEAM    HPI:   61 y/o male from [facility], pmhx: dwarfism, neurogenic bladder with suprapubic catheter, paraplegia (uses wheelchair), restless leg, muscle spasms, HTN, ileus, former etoh user, smoker,  uses home O2 (2lpm).    C/o abdominal bloating for the past 4 days along with coffee-ground and red-streaked emesis for two days (upwards of 10x). Denies bloody/dark stool, GERD, fever, chills, diarrhea,  chest pain, SOB. States last BM was 3 days ago.      In Emergency, 102/69, , 99.1F, SpO2  96% 3lpm. Labs were significant for WBC 15.54, Hgb 13.9 (2mo ago 11.5), Cl- 86, HCO3- 39, BUN 60, Cr wnl, anion gap 19, UA PRO +2, Occult blood +2, Leuks +3, many deann on micro. Trip,  lipase , INR, wnl. CXR showed evidence of distended abd. CT showed Large colonic and rectal stool burden resulting suggesting constipation, with rectal impaction not excluded. Convincing evidence for associated small bowel obstruction or stercoral proctocolitis at this time. Marked distension of the stomach to the level of the duodenal C-loop which remains patent, likely related to mass effect from stool-filled and distended right hepatic flexure large bowel loops with concern for least partial/developing gastric outlet obstruction. He was started on Rocephin 1g, IV protonix. GI and Surgery were consulted.  He was admitted for UTI, susp GI bleed and susp fecal impaction.       Procedure(s) (LRB):  EGD (ESOPHAGOGASTRODUODENOSCOPY) (N/A)      Hospital Course:   Patient remained stable entire hospital course. He did  not have any reoccurrence of hematemesis. He was started on aggressive bowel regimen for a goal of 2-3 BM daily to reduce stool burden. Surgery saw the patient and did not diagnose a bowel obstruction. They advised conservative management and GI consult. GI suggested IV protonix and NG stomach decompression. They stated they would pass EGD after stool burden was relieved. They recommended enemas TID. The patient's Hbg remained stable and did not require any blood transfusions. Patient was given several enemas with improvement. Bowel regimen was de-escalated. NG tube was removed on 1/2 after patient tolerated regular diet. Patient's suprapubic catheter was changed by Urology on 1/2/23 with no re-occurrence of leaking from incision site. EGD performed on 1/3/23 which showed grade D esophagitis, for which GI recommended PPI BID for 8 weeks and repeat EGD to assess for therapy response. He was initially put on IV rocephin for UTI 2/2 indwelling chronic suprapubic catheter. ID consulted on 1/3 for UC showing E.Coli and Proteus with concern for ESBL. Patient was switched to Meropenem on 1/3 and will continue for a total of 7 days ending on 1/10. Midline was placed for patient to receive IV antibiotics. Patient medically stable for discharge back to [facility]. Return precautions discussed. All questions answered. Patient verbalized understanding.        Goals of Care Treatment Preferences:  Code Status: Full Code    Living Will: Yes            Consults:   Consults (From admission, onward)        Status Ordering Provider     Inpatient consult to Infectious Diseases  Once        Provider:  Amari Albarado MD    Completed UYEN RUBIO     Inpatient consult to Urology  Once        Provider:  Arlene Matthews MD    Completed LYNNE BARTON     IP consult to case management  Once        Provider:  (Not yet assigned)    Completed LYNNE BARTON     Inpatient consult to General Surgery  Once        Provider:  Jeffery SCHMITZ  MD Leon    Completed REGGIE GARCIA     Inpatient consult to Gastroenterology  Once        Provider:  Argenis Calderón MD    Completed GALDINO BOLDEN          No new Assessment & Plan notes have been filed under this hospital service since the last note was generated.  Service: Hospital Medicine    Final Active Diagnoses:    Diagnosis Date Noted POA    PRINCIPAL PROBLEM:  GI bleed [K92.2] 12/29/2022 Yes    Vomiting [R11.10] 12/30/2022 Yes    Constipation [K59.00] 12/29/2022 Yes    UTI (urinary tract infection) [N39.0] 12/29/2022 Yes    Muscle spasms of both lower extremities [M62.838] 12/29/2022 Yes    HTN (hypertension) [I10] 12/29/2022 Yes    Partial gastric outlet obstruction [K31.1] 12/29/2022 Yes      Problems Resolved During this Admission:       Discharged Condition: stable    Disposition: Skilled Nursing Facility    Follow Up:  GI 8 weeks 2/28/23 for repeat EGD to assess response to therapy    Significant Diagnostic Studies: Labs: All labs within the past 24 hours have been reviewed    Pending Diagnostic Studies:     None         Medications:  Transfer Medications (for Discharge Readmit only):   No current facility-administered medications for this encounter.     Current Outpatient Medications   Medication Sig Dispense Refill    acetaminophen (TYLENOL) 325 MG tablet Take 650 mg by mouth every 6 (six) hours as needed for Temperature greater than (100.5).      baclofen (LIORESAL) 20 MG tablet Take 20 mg by mouth nightly.      diltiaZEM (CARDIZEM) 60 MG tablet Take 60 mg by mouth 2 (two) times a day.      docusate sodium (COLACE) 100 MG capsule Take 100 mg by mouth 2 (two) times daily.      ferrous sulfate (FEOSOL) 325 mg (65 mg iron) Tab tablet Take 325 mg by mouth once daily.      furosemide (LASIX) 20 MG tablet Take 20 mg by mouth every other day.      gabapentin (NEURONTIN) 600 MG tablet Take 600 mg by mouth 3 (three) times daily.      guaiFENesin (MUCINEX) 600 mg 12 hr  tablet Take 600 mg by mouth 2 (two) times daily as needed for Cough or Congestion.      hydrocodone-acetaminophen (HYCET) solution 7.5-325 mg/15mL Take 15 mLs by mouth every 8 (eight) hours as needed for Pain.      lisinopriL (PRINIVIL,ZESTRIL) 5 MG tablet Take 5 mg by mouth once daily.      magnesium oxide (MAG-OX) 400 mg (241.3 mg magnesium) tablet Take 400 mg by mouth once daily. At 12 noon      melatonin (MELATIN) 5 mg Take 5 mg by mouth every evening.      multivitamin (THERAGRAN) per tablet Take 1 tablet by mouth once daily.      nicotine (NICODERM CQ) 14 mg/24 hr Place 1 patch onto the skin once daily.      ondansetron (ZOFRAN) 4 MG tablet Take 4 mg by mouth every 6 (six) hours as needed for Nausea (or vomiting).      polyethylene glycol (GLYCOLAX) 17 gram/dose powder Take 17 g by mouth once daily. In 8 ounces of water      rOPINIRole (REQUIP) 4 MG tablet Take 4 mg by mouth every evening.      senna (SENOKOT) 8.6 mg tablet Take 2 tablets by mouth nightly as needed for Constipation.      sodium chloride 1 gram tablet Take 1 g by mouth once daily.      tamsulosin (FLOMAX) 0.4 mg Cap Take 0.4 mg by mouth every evening.      tiZANidine (ZANAFLEX) 6 mg capsule Take 6 mg by mouth every morning.      tiZANidine (ZANAFLEX) 6 mg capsule Take 12 mg by mouth every evening. 2 capsules      traZODone (DESYREL) 100 MG tablet Take 200 mg by mouth every evening.      MEROPENEM 1 G/100 ML NS, READY TO MIX SYSTEM, Inject 100 mLs (1 g total) into the vein every 8 (eight) hours. for 7 days      pantoprazole (PROTONIX) 40 MG tablet Take 1 tablet (40 mg total) by mouth 2 (two) times daily. 60 tablet 1       Indwelling Lines/Drains at time of discharge:   Lines/Drains/Airways     Drain  Duration                Suprapubic Catheter 01/02/23 0630 2 days          Airway  Duration                Airway - Non-Surgical 01/03/23 0848 Nasal Cannula <1 day                Time spent on the discharge of patient: 30  minutes    Martha Villatoro MD  [facility], PGY-1  01/04/2023

## 2023-01-05 LAB
BACTERIA BLD CULT: NORMAL
BACTERIA BLD CULT: NORMAL

## 2023-04-03 PROBLEM — K92.2 GI BLEED: Status: RESOLVED | Noted: 2022-12-29 | Resolved: 2023-04-03

## 2023-04-03 PROBLEM — N39.0 UTI (URINARY TRACT INFECTION): Status: RESOLVED | Noted: 2022-12-29 | Resolved: 2023-04-03
